# Patient Record
Sex: MALE | Race: ASIAN
[De-identification: names, ages, dates, MRNs, and addresses within clinical notes are randomized per-mention and may not be internally consistent; named-entity substitution may affect disease eponyms.]

---

## 2017-01-20 ENCOUNTER — HOSPITAL (OUTPATIENT)
Dept: OTHER | Age: 67
End: 2017-01-20
Attending: UROLOGY

## 2017-01-20 ENCOUNTER — HOSPITAL (OUTPATIENT)
Dept: OTHER | Age: 67
End: 2017-01-20
Attending: INTERNAL MEDICINE

## 2017-01-23 ENCOUNTER — HOSPITAL (OUTPATIENT)
Dept: OTHER | Age: 67
End: 2017-01-23
Attending: UROLOGY

## 2017-01-26 ENCOUNTER — HOSPITAL (OUTPATIENT)
Dept: OTHER | Age: 67
End: 2017-01-26
Attending: INTERNAL MEDICINE

## 2017-01-26 ENCOUNTER — DIAGNOSTIC TRANS (OUTPATIENT)
Dept: OTHER | Age: 67
End: 2017-01-26

## 2017-01-26 LAB — GLUCOSE BLDC GLUCOMTR-MCNC: 161 MG/DL (ref 65–99)

## 2017-01-30 ENCOUNTER — HOSPITAL (OUTPATIENT)
Dept: OTHER | Age: 67
End: 2017-01-30

## 2017-01-30 LAB
ANALYZER ANC (IANC): ABNORMAL
ERYTHROCYTE [DISTWIDTH] IN BLOOD: 12.2 % (ref 11–15)
GLUCOSE BLDC GLUCOMTR-MCNC: 172 MG/DL (ref 65–99)
GLUCOSE BLDC GLUCOMTR-MCNC: 193 MG/DL (ref 65–99)
HEMATOCRIT: 42.2 % (ref 39–51)
HGB BLD-MCNC: 14.7 GM/DL (ref 13–17)
INR PPP: 1
MCH RBC QN AUTO: 31.6 PG (ref 26–34)
MCHC RBC AUTO-ENTMCNC: 34.8 GM/DL (ref 32–36.5)
MCV RBC AUTO: 90.8 FL (ref 78–100)
PLATELET # BLD: 232 THOUSAND/MCL (ref 140–450)
PROTHROMBIN TIME: 10.8 SECONDS (ref 9.7–11.8)
PROTHROMBIN TIME: NORMAL
RBC # BLD: 4.65 MILLION/MCL (ref 4.5–5.9)
WBC # BLD: 11.8 THOUSAND/MCL (ref 4.2–11)

## 2017-02-20 ENCOUNTER — HOSPITAL (OUTPATIENT)
Dept: OTHER | Age: 67
End: 2017-02-20
Attending: UROLOGY

## 2017-02-20 LAB
GLUCOSE BLDC GLUCOMTR-MCNC: 168 MG/DL (ref 65–99)
GLUCOSE BLDC GLUCOMTR-MCNC: 175 MG/DL (ref 65–99)
GLUCOSE BLDC GLUCOMTR-MCNC: 177 MG/DL (ref 65–99)

## 2017-02-21 ENCOUNTER — CHARTING TRANS (OUTPATIENT)
Dept: OTHER | Age: 67
End: 2017-02-21

## 2017-02-24 ENCOUNTER — HOSPITAL (OUTPATIENT)
Dept: OTHER | Age: 67
End: 2017-02-24
Attending: UROLOGY

## 2017-04-04 ENCOUNTER — HOSPITAL (OUTPATIENT)
Dept: OTHER | Age: 67
End: 2017-04-04
Attending: INTERNAL MEDICINE

## 2017-04-04 LAB
ANALYZER ANC (IANC): ABNORMAL
ANION GAP SERPL CALC-SCNC: 13 MMOL/L (ref 10–20)
BUN SERPL-MCNC: 18 MG/DL (ref 6–20)
BUN/CREAT SERPL: 19 (ref 7–25)
CALCIUM SERPL-MCNC: 8.7 MG/DL (ref 8.4–10.2)
CHLORIDE: 105 MMOL/L (ref 98–107)
CO2 SERPL-SCNC: 24 MMOL/L (ref 21–32)
CREAT SERPL-MCNC: 0.93 MG/DL (ref 0.67–1.17)
ERYTHROCYTE [DISTWIDTH] IN BLOOD: 12.5 % (ref 11–15)
GLUCOSE BLDC GLUCOMTR-MCNC: 185 MG/DL (ref 65–99)
GLUCOSE BLDC GLUCOMTR-MCNC: 89 MG/DL (ref 65–99)
GLUCOSE SERPL-MCNC: 125 MG/DL (ref 65–99)
GLYCOHEMOGLOBIN: 7.4 % (ref 4.5–5.6)
HEMATOCRIT: 38.7 % (ref 39–51)
HGB BLD-MCNC: 13.4 GM/DL (ref 13–17)
MCH RBC QN AUTO: 31.8 PG (ref 26–34)
MCHC RBC AUTO-ENTMCNC: 34.6 GM/DL (ref 32–36.5)
MCV RBC AUTO: 91.9 FL (ref 78–100)
PLATELET # BLD: 221 THOUSAND/MCL (ref 140–450)
POTASSIUM SERPL-SCNC: 4.4 MMOL/L (ref 3.4–5.1)
RBC # BLD: 4.21 MILLION/MCL (ref 4.5–5.9)
SODIUM SERPL-SCNC: 138 MMOL/L (ref 135–145)
WBC # BLD: 11.1 THOUSAND/MCL (ref 4.2–11)

## 2017-04-05 LAB
ALBUMIN SERPL-MCNC: 3.6 GM/DL (ref 3.6–5.1)
ALBUMIN/GLOB SERPL: 1 {RATIO} (ref 1–2.4)
ALP SERPL-CCNC: 100 UNIT/L (ref 45–117)
ALT SERPL-CCNC: 31 UNIT/L
ANALYZER ANC (IANC): ABNORMAL
ANION GAP SERPL CALC-SCNC: 13 MMOL/L (ref 10–20)
AST SERPL-CCNC: 21 UNIT/L
BASOPHILS # BLD: 0 THOUSAND/MCL (ref 0–0.3)
BASOPHILS NFR BLD: 0 %
BILIRUB SERPL-MCNC: 0.3 MG/DL (ref 0.2–1)
BUN SERPL-MCNC: 13 MG/DL (ref 6–20)
BUN/CREAT SERPL: 14 (ref 7–25)
CALCIUM SERPL-MCNC: 8.8 MG/DL (ref 8.4–10.2)
CHLORIDE: 106 MMOL/L (ref 98–107)
CO2 SERPL-SCNC: 25 MMOL/L (ref 21–32)
CREAT SERPL-MCNC: 0.92 MG/DL (ref 0.67–1.17)
DIFFERENTIAL METHOD BLD: ABNORMAL
EOSINOPHIL # BLD: 0.4 THOUSAND/MCL (ref 0.1–0.5)
EOSINOPHIL NFR BLD: 4 %
ERYTHROCYTE [DISTWIDTH] IN BLOOD: 12.5 % (ref 11–15)
GLOBULIN SER-MCNC: 3.5 GM/DL (ref 2–4)
GLUCOSE BLDC GLUCOMTR-MCNC: 134 MG/DL (ref 65–99)
GLUCOSE BLDC GLUCOMTR-MCNC: 182 MG/DL (ref 65–99)
GLUCOSE SERPL-MCNC: 139 MG/DL (ref 65–99)
HEMATOCRIT: 40.5 % (ref 39–51)
HGB BLD-MCNC: 14.3 GM/DL (ref 13–17)
LYMPHOCYTES # BLD: 2.5 THOUSAND/MCL (ref 1–4)
LYMPHOCYTES NFR BLD: 25 %
MAGNESIUM SERPL-MCNC: 1.9 MG/DL (ref 1.7–2.4)
MCH RBC QN AUTO: 32.1 PG (ref 26–34)
MCHC RBC AUTO-ENTMCNC: 35.3 GM/DL (ref 32–36.5)
MCV RBC AUTO: 91 FL (ref 78–100)
MONOCYTES # BLD: 0.7 THOUSAND/MCL (ref 0.3–0.9)
MONOCYTES NFR BLD: 7 %
NEUTROPHILS # BLD: 6.5 THOUSAND/MCL (ref 1.8–7.7)
NEUTROPHILS NFR BLD: 64 %
NEUTS SEG NFR BLD: ABNORMAL %
PERCENT NRBC: ABNORMAL
PLATELET # BLD: 228 THOUSAND/MCL (ref 140–450)
POTASSIUM SERPL-SCNC: 4 MMOL/L (ref 3.4–5.1)
PROT SERPL-MCNC: 7.1 GM/DL (ref 6.4–8.2)
RBC # BLD: 4.45 MILLION/MCL (ref 4.5–5.9)
SODIUM SERPL-SCNC: 140 MMOL/L (ref 135–145)
WBC # BLD: 10.1 THOUSAND/MCL (ref 4.2–11)

## 2017-04-26 ENCOUNTER — PRIOR ORIGINAL RECORDS (OUTPATIENT)
Dept: OTHER | Age: 67
End: 2017-04-26

## 2017-05-01 ENCOUNTER — PRIOR ORIGINAL RECORDS (OUTPATIENT)
Dept: OTHER | Age: 67
End: 2017-05-01

## 2017-05-01 ENCOUNTER — HOSPITAL (OUTPATIENT)
Dept: OTHER | Age: 67
End: 2017-05-01
Attending: INTERNAL MEDICINE

## 2017-05-01 LAB
ANALYZER ANC (IANC): ABNORMAL
ANION GAP SERPL CALC-SCNC: 14 MMOL/L (ref 10–20)
BUN SERPL-MCNC: 26 MG/DL (ref 6–20)
BUN/CREAT SERPL: 26 (ref 7–25)
CALCIUM SERPL-MCNC: 8.8 MG/DL (ref 8.4–10.2)
CHLORIDE: 105 MMOL/L (ref 98–107)
CO2 SERPL-SCNC: 25 MMOL/L (ref 21–32)
CREAT SERPL-MCNC: 0.99 MG/DL (ref 0.67–1.17)
ERYTHROCYTE [DISTWIDTH] IN BLOOD: 12.9 % (ref 11–15)
GLUCOSE BLDC GLUCOMTR-MCNC: 125 MG/DL (ref 65–99)
GLUCOSE BLDC GLUCOMTR-MCNC: 130 MG/DL (ref 65–99)
GLUCOSE BLDC GLUCOMTR-MCNC: 164 MG/DL (ref 65–99)
GLUCOSE BLDC GLUCOMTR-MCNC: 87 MG/DL (ref 65–99)
GLUCOSE SERPL-MCNC: 145 MG/DL (ref 65–99)
HEMATOCRIT: 38.6 % (ref 39–51)
HGB BLD-MCNC: 13.4 GM/DL (ref 13–17)
MCH RBC QN AUTO: 31.7 PG (ref 26–34)
MCHC RBC AUTO-ENTMCNC: 34.7 GM/DL (ref 32–36.5)
MCV RBC AUTO: 91.3 FL (ref 78–100)
PLATELET # BLD: 219 THOUSAND/MCL (ref 140–450)
POTASSIUM SERPL-SCNC: 3.8 MMOL/L (ref 3.4–5.1)
RBC # BLD: 4.23 MILLION/MCL (ref 4.5–5.9)
SODIUM SERPL-SCNC: 140 MMOL/L (ref 135–145)
WBC # BLD: 11 THOUSAND/MCL (ref 4.2–11)

## 2017-05-02 LAB
BUN: 26 MG/DL
CALCIUM: 8.8 MG/DL
CHLORIDE: 105 MEQ/L
CREATININE, SERUM: 0.99 MG/DL
GLUCOSE BLDC GLUCOMTR-MCNC: 111 MG/DL (ref 65–99)
GLUCOSE BLDC GLUCOMTR-MCNC: 116 MG/DL (ref 65–99)
GLUCOSE BLDC GLUCOMTR-MCNC: 135 MG/DL (ref 65–99)
GLUCOSE BLDC GLUCOMTR-MCNC: 165 MG/DL (ref 65–99)
GLUCOSE: 145 MG/DL
HEMATOCRIT: 38.6 %
HEMOGLOBIN: 13.4 G/DL
PLATELETS: 219 K/UL
POTASSIUM, SERUM: 3.8 MEQ/L
RED BLOOD COUNT: 4.23 X 10-6/U
SODIUM: 140 MEQ/L
WHITE BLOOD COUNT: 11 X 10-3/U

## 2017-05-03 ENCOUNTER — PRIOR ORIGINAL RECORDS (OUTPATIENT)
Dept: OTHER | Age: 67
End: 2017-05-03

## 2017-05-03 LAB
ANION GAP SERPL CALC-SCNC: 12 MMOL/L (ref 10–20)
BUN SERPL-MCNC: 19 MG/DL (ref 6–20)
BUN/CREAT SERPL: 18 (ref 7–25)
CALCIUM SERPL-MCNC: 8.9 MG/DL (ref 8.4–10.2)
CHLORIDE: 104 MMOL/L (ref 98–107)
CO2 SERPL-SCNC: 27 MMOL/L (ref 21–32)
CREAT SERPL-MCNC: 1.05 MG/DL (ref 0.67–1.17)
GLUCOSE BLDC GLUCOMTR-MCNC: 126 MG/DL (ref 65–99)
GLUCOSE BLDC GLUCOMTR-MCNC: 134 MG/DL (ref 65–99)
GLUCOSE SERPL-MCNC: 152 MG/DL (ref 65–99)
POTASSIUM SERPL-SCNC: 4.4 MMOL/L (ref 3.4–5.1)
SODIUM SERPL-SCNC: 139 MMOL/L (ref 135–145)

## 2017-05-04 LAB
BUN: 19 MG/DL
CALCIUM: 8.9 MG/DL
CHLORIDE: 104 MEQ/L
CREATININE, SERUM: 1.05 MG/DL
GLUCOSE: 152 MG/DL
POTASSIUM, SERUM: 4.4 MEQ/L
SODIUM: 139 MEQ/L

## 2017-05-16 ENCOUNTER — MYAURORA ACCOUNT LINK (OUTPATIENT)
Dept: OTHER | Age: 67
End: 2017-05-16

## 2017-05-16 ENCOUNTER — PRIOR ORIGINAL RECORDS (OUTPATIENT)
Dept: OTHER | Age: 67
End: 2017-05-16

## 2017-05-16 ENCOUNTER — HOSPITAL (OUTPATIENT)
Dept: OTHER | Age: 67
End: 2017-05-16
Attending: INTERNAL MEDICINE

## 2017-05-24 ENCOUNTER — PRIOR ORIGINAL RECORDS (OUTPATIENT)
Dept: OTHER | Age: 67
End: 2017-05-24

## 2017-06-01 ENCOUNTER — HOSPITAL (OUTPATIENT)
Dept: OTHER | Age: 67
End: 2017-06-01
Attending: INTERNAL MEDICINE

## 2017-06-16 ENCOUNTER — HOSPITAL (OUTPATIENT)
Dept: OTHER | Age: 67
End: 2017-06-16
Attending: INTERNAL MEDICINE

## 2017-06-26 ENCOUNTER — PRIOR ORIGINAL RECORDS (OUTPATIENT)
Dept: OTHER | Age: 67
End: 2017-06-26

## 2017-07-06 PROBLEM — C61 PROSTATE CANCER (HCC): Status: ACTIVE | Noted: 2017-07-06

## 2017-08-09 PROBLEM — M54.41 CHRONIC RIGHT-SIDED LOW BACK PAIN WITH RIGHT-SIDED SCIATICA: Status: ACTIVE | Noted: 2017-08-09

## 2017-08-09 PROBLEM — G89.29 CHRONIC RIGHT-SIDED LOW BACK PAIN WITH RIGHT-SIDED SCIATICA: Status: ACTIVE | Noted: 2017-08-09

## 2017-08-24 ENCOUNTER — HOSPITAL ENCOUNTER (OUTPATIENT)
Facility: HOSPITAL | Age: 67
Setting detail: OBSERVATION
Discharge: HOME HEALTH | End: 2017-08-26
Attending: EMERGENCY MEDICINE | Admitting: INTERNAL MEDICINE
Payer: MEDICARE

## 2017-08-24 DIAGNOSIS — M54.9 INTRACTABLE BACK PAIN: Primary | ICD-10-CM

## 2017-08-24 LAB
ANION GAP SERPL CALC-SCNC: 9 MMOL/L (ref 0–18)
BASOPHILS # BLD: 0.1 K/UL (ref 0–0.2)
BASOPHILS NFR BLD: 1 %
BUN SERPL-MCNC: 24 MG/DL (ref 8–20)
BUN/CREAT SERPL: 25.8 (ref 10–20)
CALCIUM SERPL-MCNC: 9.1 MG/DL (ref 8.5–10.5)
CHLORIDE SERPL-SCNC: 104 MMOL/L (ref 95–110)
CO2 SERPL-SCNC: 23 MMOL/L (ref 22–32)
CREAT SERPL-MCNC: 0.93 MG/DL (ref 0.5–1.5)
EOSINOPHIL # BLD: 0.2 K/UL (ref 0–0.7)
EOSINOPHIL NFR BLD: 2 %
ERYTHROCYTE [DISTWIDTH] IN BLOOD BY AUTOMATED COUNT: 12.9 % (ref 11–15)
GLUCOSE BLDC GLUCOMTR-MCNC: 103 MG/DL (ref 70–99)
GLUCOSE BLDC GLUCOMTR-MCNC: 206 MG/DL (ref 70–99)
GLUCOSE SERPL-MCNC: 129 MG/DL (ref 70–99)
HCT VFR BLD AUTO: 39.2 % (ref 41–52)
HGB BLD-MCNC: 13.5 G/DL (ref 13.5–17.5)
LYMPHOCYTES # BLD: 3 K/UL (ref 1–4)
LYMPHOCYTES NFR BLD: 26 %
MCH RBC QN AUTO: 32.4 PG (ref 27–32)
MCHC RBC AUTO-ENTMCNC: 34.5 G/DL (ref 32–37)
MCV RBC AUTO: 94 FL (ref 80–100)
MONOCYTES # BLD: 0.8 K/UL (ref 0–1)
MONOCYTES NFR BLD: 7 %
NEUTROPHILS # BLD AUTO: 7.5 K/UL (ref 1.8–7.7)
NEUTROPHILS NFR BLD: 65 %
OSMOLALITY UR CALC.SUM OF ELEC: 288 MOSM/KG (ref 275–295)
PLATELET # BLD AUTO: 229 K/UL (ref 140–400)
PMV BLD AUTO: 8.6 FL (ref 7.4–10.3)
POTASSIUM SERPL-SCNC: 3.9 MMOL/L (ref 3.3–5.1)
RBC # BLD AUTO: 4.17 M/UL (ref 4.5–5.9)
SODIUM SERPL-SCNC: 136 MMOL/L (ref 136–144)
WBC # BLD AUTO: 11.6 K/UL (ref 4–11)

## 2017-08-24 PROCEDURE — 85025 COMPLETE CBC W/AUTO DIFF WBC: CPT | Performed by: EMERGENCY MEDICINE

## 2017-08-24 PROCEDURE — 82962 GLUCOSE BLOOD TEST: CPT

## 2017-08-24 PROCEDURE — 99285 EMERGENCY DEPT VISIT HI MDM: CPT

## 2017-08-24 PROCEDURE — 80048 BASIC METABOLIC PNL TOTAL CA: CPT | Performed by: EMERGENCY MEDICINE

## 2017-08-24 PROCEDURE — 96374 THER/PROPH/DIAG INJ IV PUSH: CPT

## 2017-08-24 RX ORDER — BICALUTAMIDE 50 MG/1
50 TABLET, FILM COATED ORAL DAILY
Status: DISCONTINUED | OUTPATIENT
Start: 2017-08-24 | End: 2017-08-26

## 2017-08-24 RX ORDER — TIZANIDINE 4 MG/1
4 TABLET ORAL DAILY
Status: DISCONTINUED | OUTPATIENT
Start: 2017-08-24 | End: 2017-08-26

## 2017-08-24 RX ORDER — FLUOXETINE HYDROCHLORIDE 20 MG/1
40 CAPSULE ORAL DAILY
Status: DISCONTINUED | OUTPATIENT
Start: 2017-08-24 | End: 2017-08-26

## 2017-08-24 RX ORDER — TRAZODONE HYDROCHLORIDE 150 MG/1
150 TABLET ORAL NIGHTLY
Status: DISCONTINUED | OUTPATIENT
Start: 2017-08-24 | End: 2017-08-26

## 2017-08-24 RX ORDER — HYDROMORPHONE HYDROCHLORIDE 1 MG/ML
0.5 INJECTION, SOLUTION INTRAMUSCULAR; INTRAVENOUS; SUBCUTANEOUS ONCE
Status: COMPLETED | OUTPATIENT
Start: 2017-08-24 | End: 2017-08-24

## 2017-08-24 RX ORDER — GABAPENTIN 300 MG/1
600 CAPSULE ORAL NIGHTLY
Status: DISCONTINUED | OUTPATIENT
Start: 2017-08-24 | End: 2017-08-26

## 2017-08-24 RX ORDER — ASPIRIN 81 MG/1
81 TABLET ORAL DAILY
Status: DISCONTINUED | OUTPATIENT
Start: 2017-08-24 | End: 2017-08-26

## 2017-08-24 RX ORDER — DEXTROSE MONOHYDRATE 25 G/50ML
50 INJECTION, SOLUTION INTRAVENOUS AS NEEDED
Status: DISCONTINUED | OUTPATIENT
Start: 2017-08-24 | End: 2017-08-26

## 2017-08-24 RX ORDER — VARENICLINE TARTRATE 0.5 MG/1
0.5 TABLET, FILM COATED ORAL 2 TIMES DAILY
Status: ON HOLD | COMMUNITY
End: 2017-08-24

## 2017-08-24 RX ORDER — LOSARTAN POTASSIUM 25 MG/1
25 TABLET ORAL DAILY
Status: DISCONTINUED | OUTPATIENT
Start: 2017-08-24 | End: 2017-08-26

## 2017-08-24 RX ORDER — CARVEDILOL 3.12 MG/1
3.12 TABLET ORAL 2 TIMES DAILY WITH MEALS
Status: DISCONTINUED | OUTPATIENT
Start: 2017-08-24 | End: 2017-08-26

## 2017-08-24 RX ORDER — VARENICLINE TARTRATE 1 MG/1
1 TABLET, FILM COATED ORAL 2 TIMES DAILY
COMMUNITY
End: 2018-11-28

## 2017-08-24 RX ORDER — ATORVASTATIN CALCIUM 20 MG/1
20 TABLET, FILM COATED ORAL NIGHTLY
Status: DISCONTINUED | OUTPATIENT
Start: 2017-08-24 | End: 2017-08-24

## 2017-08-24 RX ORDER — SODIUM CHLORIDE 0.9 % (FLUSH) 0.9 %
3 SYRINGE (ML) INJECTION AS NEEDED
Status: DISCONTINUED | OUTPATIENT
Start: 2017-08-24 | End: 2017-08-26

## 2017-08-24 RX ORDER — BUPROPION HYDROCHLORIDE 150 MG/1
150 TABLET, EXTENDED RELEASE ORAL 2 TIMES DAILY
Status: DISCONTINUED | OUTPATIENT
Start: 2017-08-24 | End: 2017-08-26

## 2017-08-24 RX ORDER — INSULIN ASPART 100 [IU]/ML
30 INJECTION, SOLUTION INTRAVENOUS; SUBCUTANEOUS
COMMUNITY

## 2017-08-24 RX ORDER — ATORVASTATIN CALCIUM 20 MG/1
20 TABLET, FILM COATED ORAL NIGHTLY
Status: DISCONTINUED | OUTPATIENT
Start: 2017-08-24 | End: 2017-08-26

## 2017-08-24 RX ORDER — FLUOXETINE HYDROCHLORIDE 40 MG/1
40 CAPSULE ORAL DAILY
COMMUNITY
End: 2018-11-28

## 2017-08-24 RX ORDER — HYDROCODONE BITARTRATE AND ACETAMINOPHEN 10; 325 MG/1; MG/1
1 TABLET ORAL EVERY 6 HOURS PRN
Status: DISCONTINUED | OUTPATIENT
Start: 2017-08-24 | End: 2017-08-26

## 2017-08-24 RX ORDER — TIZANIDINE 4 MG/1
4 TABLET ORAL DAILY
COMMUNITY
End: 2017-09-27

## 2017-08-24 RX ORDER — VARENICLINE TARTRATE 1 MG/1
1 TABLET, FILM COATED ORAL 2 TIMES DAILY
Status: DISCONTINUED | OUTPATIENT
Start: 2017-08-24 | End: 2017-08-26

## 2017-08-24 RX ORDER — ATORVASTATIN CALCIUM 20 MG/1
20 TABLET, FILM COATED ORAL NIGHTLY
COMMUNITY

## 2017-08-24 RX ORDER — IBUPROFEN 400 MG/1
400 TABLET ORAL 3 TIMES DAILY
Status: DISCONTINUED | OUTPATIENT
Start: 2017-08-24 | End: 2017-08-26

## 2017-08-24 NOTE — ED INITIAL ASSESSMENT (HPI)
Pt received via EMS from home with c/o chronic lower back pain. Pt states he is to have a laminectomy on 9/25, but the pain is to severe at this time. Denies any loss of bowel or bladder.

## 2017-08-24 NOTE — ED PROVIDER NOTES
Patient Seen in: [facility] AND [facility] Emergency Department    History   Patient presents with:  Back Pain (musculoskeletal)    Stated Complaint: chronic low back pain    HPI    Patient presents the emergency department complaining of low back  Pain.   Ivette DO AYSHA;  Location: [address] [place] [address]  1/11/2016: INJECTION, ANESTHETIC/STEROID, TRANSFORAMINAL * Right      Comment: Procedure: LUMBAR / TRANSFORAMINAL EPIDURAL                STEROID INJECTION;  Surgeon: Khari Olivo, [address]  1/11/2016: PATIENT [place] PREOPERATIVE ORDER FOR IV ANT* Right      Comment: Procedure: LUMBAR / TRANSFORAMINAL EPIDURAL                STEROID INJECTION;  Surgeon: Emerita Solorio;  Location: [address] [place] [address] History   Problem Relation Age of Onset   • Diabetes Father    • Diabetes Mother        Smoking status: Current Every Day Smoker                                                   Packs/day: 0.00      Years: 0.00      Smokeless tobacco: Never Used RBC 4.17 (*)     HCT 39.2 (*)     MCH 32.4 (*)     All other components within normal limits   CBC WITH DIFFERENTIAL WITH PLATELET    Narrative: The following orders were created for panel order CBC WITH DIFFERENTIAL WITH PLATELET.   Procedure

## 2017-08-25 LAB
GLUCOSE BLDC GLUCOMTR-MCNC: 102 MG/DL (ref 70–99)
GLUCOSE BLDC GLUCOMTR-MCNC: 77 MG/DL (ref 70–99)
GLUCOSE BLDC GLUCOMTR-MCNC: 82 MG/DL (ref 70–99)
GLUCOSE BLDC GLUCOMTR-MCNC: 83 MG/DL (ref 70–99)
HBA1C MFR BLD: 6.7 % (ref 4–6)

## 2017-08-25 PROCEDURE — 82962 GLUCOSE BLOOD TEST: CPT

## 2017-08-25 PROCEDURE — 83036 HEMOGLOBIN GLYCOSYLATED A1C: CPT | Performed by: INTERNAL MEDICINE

## 2017-08-25 NOTE — PROGRESS NOTES
S: Patient was seen this morning with Dr. Fred Paul. He was seen in clinic this week. He has history of previous lumbar fusion with lumbar stenosis. Patient had lumbar ZACHARY on Monday, he was admitted due to intractable back pain.   This morning, patient notes ba Patient agrees with this plan and will follow up as indicated.       Gia Martines MD

## 2017-08-25 NOTE — CHRONIC PAIN
[facility] HOSP - [facility]  Report of Consultation    Jeniffer Marypatricia Patient Status:  Observation    6/15/1950 MRN [id number]   Location [facility] Attending Tish Glsas MD   Hosp Day # 0 PCP Hugo Howard MD     Date of Admission:   TRANSFORAMINAL EPIDURAL                STEROID INJECTION;  Surgeon: Jey Gómez DO;  Location: [address] [place] [address]  5/23/2016: INJECTION, ANESTHETIC/STEROID, TRANSFORAMINAL * Right      Comment: Procedure: LUMBAR / TRANSFORAMINAL STEROID INJECTION;  Surgeon: Mily Anderson DO;  Location: [address] [place] [address]  5/23/2016: PATIENT DOCUMENTED NOT TO HAVE EXPERIENCED ANY* Right      Comment: Procedure: LUMBAR / TRANSFORAMINAL EPIDURAL                STEROID INJECTI 3.125 mg, Oral, BID with meals  •  FLUoxetine HCl (PROZAC) cap 40 mg, 40 mg, Oral, Daily  •  gabapentin (NEURONTIN) cap 600 mg, 600 mg, Oral, Nightly  •  Insulin Aspart Pen (NOVOLOG) 100 UNIT/ML flexpen 30 Units, 30 Units, Subcutaneous, TID AC  •  insulin Prostate cancer ([facility] [address]     Chronic right-sided low back pain with right-sided sciatica     Intractable back pain  s/p epidural steroid injection probable steroid flare with increase pain    Recommendations:    Agree with current meds   Dr Marcel Gonzalez on consult

## 2017-08-25 NOTE — PLAN OF CARE
Problem: Patient Centered Care  Goal: Patient preferences are identified and integrated in the patient's plan of care  Interventions:  - What would you like us to know as we care for you?  - Provide timely, complete, and accurate information to patient/fam

## 2017-08-25 NOTE — H&P
[facility] Patient Status:  Observation    6/15/1950 MRN [id number]   Location [address] [facility] Attending Shanda Schultz MD   Hosp Day # 0 PCP Maynor [place] MD     History of Present I DO AYSHA;  Location: [address] [place] [address]  5/23/2016: INJECTION, ANESTHETIC/STEROID, TRANSFORAMINAL * Right      Comment: Procedure: LUMBAR / TRANSFORAMINAL EPIDURAL                STEROID INJECTION;  Surgeon: Kane Gee, [address]  5/23/2016: PATIENT DOCUMENTED NOT TO HAVE EXPERIENCED ANY* Right      Comment: Procedure: LUMBAR / TRANSFORAMINAL EPIDURAL                STEROID INJECTION;  Surgeon: Emerita Solorio;  Location: [address] [place] [address] Inject 60 Units into the skin nightly. Disp:  Rfl:     Varenicline Tartrate 1 MG Oral Tab Take 1 mg by mouth 2 (two) times daily. Disp:  Rfl:     aspirin 81 MG Oral Tab Take 81 mg by mouth daily.  Disp:  Rfl:     HYDROcodone-acetaminophen  MG Oral Tab symmetrical, trachea midline, no adenopathy;        thyroid:  No enlargement/tenderness/nodules; no carotid    bruit or JVD   Back:     Pain and tenderness on LS spine palpation , LROM on flexion extension    Lungs:     Clear to auscultation bilaterally, r

## 2017-08-25 NOTE — H&P
S: Mr. Steven Treadwell was admitted yesterday due to intractable back pain. He has history of previous lumbar fusion with Dr. Lakeisha Grayson. He recently saw Dr. Lakeisha Grayson in clinic and was found to have stenosis above level of previous fusion. He had an ZACHARY last week.  Today

## 2017-08-26 VITALS
DIASTOLIC BLOOD PRESSURE: 53 MMHG | HEIGHT: 67 IN | SYSTOLIC BLOOD PRESSURE: 106 MMHG | RESPIRATION RATE: 18 BRPM | BODY MASS INDEX: 35 KG/M2 | WEIGHT: 223 LBS | OXYGEN SATURATION: 97 % | HEART RATE: 72 BPM | TEMPERATURE: 98 F

## 2017-08-26 LAB
ALBUMIN SERPL BCP-MCNC: 3.5 G/DL (ref 3.5–4.8)
ALBUMIN/GLOB SERPL: 1.4 {RATIO} (ref 1–2)
ALP SERPL-CCNC: 91 U/L (ref 32–100)
ALT SERPL-CCNC: 28 U/L (ref 17–63)
ANION GAP SERPL CALC-SCNC: 3 MMOL/L (ref 0–18)
AST SERPL-CCNC: 28 U/L (ref 15–41)
BASOPHILS # BLD: 0 K/UL (ref 0–0.2)
BASOPHILS NFR BLD: 0 %
BILIRUB SERPL-MCNC: 0.5 MG/DL (ref 0.3–1.2)
BUN SERPL-MCNC: 19 MG/DL (ref 8–20)
BUN/CREAT SERPL: 17 (ref 10–20)
CALCIUM SERPL-MCNC: 9.2 MG/DL (ref 8.5–10.5)
CHLORIDE SERPL-SCNC: 106 MMOL/L (ref 95–110)
CO2 SERPL-SCNC: 30 MMOL/L (ref 22–32)
CREAT SERPL-MCNC: 1.12 MG/DL (ref 0.5–1.5)
EOSINOPHIL # BLD: 0.4 K/UL (ref 0–0.7)
EOSINOPHIL NFR BLD: 3 %
ERYTHROCYTE [DISTWIDTH] IN BLOOD BY AUTOMATED COUNT: 12.8 % (ref 11–15)
GLOBULIN PLAS-MCNC: 2.5 G/DL (ref 2.5–3.7)
GLUCOSE BLDC GLUCOMTR-MCNC: 101 MG/DL (ref 70–99)
GLUCOSE SERPL-MCNC: 103 MG/DL (ref 70–99)
HCT VFR BLD AUTO: 39.6 % (ref 41–52)
HGB BLD-MCNC: 13.5 G/DL (ref 13.5–17.5)
LYMPHOCYTES # BLD: 3.1 K/UL (ref 1–4)
LYMPHOCYTES NFR BLD: 23 %
MCH RBC QN AUTO: 32.3 PG (ref 27–32)
MCHC RBC AUTO-ENTMCNC: 34.2 G/DL (ref 32–37)
MCV RBC AUTO: 94.5 FL (ref 80–100)
MONOCYTES # BLD: 1 K/UL (ref 0–1)
MONOCYTES NFR BLD: 7 %
NEUTROPHILS # BLD AUTO: 9.2 K/UL (ref 1.8–7.7)
NEUTROPHILS NFR BLD: 67 %
OSMOLALITY UR CALC.SUM OF ELEC: 291 MOSM/KG (ref 275–295)
PLATELET # BLD AUTO: 220 K/UL (ref 140–400)
PMV BLD AUTO: 8.8 FL (ref 7.4–10.3)
POTASSIUM SERPL-SCNC: 4.4 MMOL/L (ref 3.3–5.1)
PROT SERPL-MCNC: 6 G/DL (ref 5.9–8.4)
RBC # BLD AUTO: 4.19 M/UL (ref 4.5–5.9)
SODIUM SERPL-SCNC: 139 MMOL/L (ref 136–144)
WBC # BLD AUTO: 13.7 K/UL (ref 4–11)

## 2017-08-26 PROCEDURE — 97166 OT EVAL MOD COMPLEX 45 MIN: CPT

## 2017-08-26 PROCEDURE — 80053 COMPREHEN METABOLIC PANEL: CPT | Performed by: FAMILY MEDICINE

## 2017-08-26 PROCEDURE — 82962 GLUCOSE BLOOD TEST: CPT

## 2017-08-26 PROCEDURE — 97116 GAIT TRAINING THERAPY: CPT

## 2017-08-26 PROCEDURE — 96372 THER/PROPH/DIAG INJ SC/IM: CPT

## 2017-08-26 PROCEDURE — 97535 SELF CARE MNGMENT TRAINING: CPT

## 2017-08-26 PROCEDURE — 85025 COMPLETE CBC W/AUTO DIFF WBC: CPT | Performed by: FAMILY MEDICINE

## 2017-08-26 PROCEDURE — 97162 PT EVAL MOD COMPLEX 30 MIN: CPT

## 2017-08-26 NOTE — OCCUPATIONAL THERAPY NOTE
OCCUPATIONAL THERAPY EVALUATION - INPATIENT      Room Number: 521/521-A  Evaluation Date: 8/26/2017  Type of Evaluation: Initial  Presenting Problem:  (Low Back Pain)    Physician Order: IP Consult to Occupational Therapy  Reason for Therapy: ADL/IADL Dysf ANESTHETIC/STEROID, TRANSFORAMINAL * Right      Comment: Procedure: LUMBAR / TRANSFORAMINAL EPIDURAL                STEROID INJECTION;  Surgeon: Syeda Beach DO;  Location: [address] [place] [address]  3/21/2016: INJECTION, ANESTHETIC/STER Right      Comment: Procedure: LUMBAR / TRANSFORAMINAL EPIDURAL                STEROID INJECTION;  Surgeon: Rachael Garcia DO;  Location: [address] [place] [address]  3/21/2016: PATIENT DOCUMENTED NOT TO HAVE EXPERIENCED ANY* Right      Comm assistance to have help 3x/week for 3hours/day. Also plans on having a tub transfer bench. SUBJECTIVE  \"I'm just tired of having pain. \"    Patient self-stated goal is: \"I wish they could move up my surgery. \"    OCCUPATIONAL THERAPY EXAMINATION     O patient questions and concerns addressed; Alarm set      PLAN  Patient has been evaluated and presents with no skilled Occupational Therapy needs at this time. Patient discharged from Occupational Therapy services.   Please re-order if a new functional limi

## 2017-08-26 NOTE — DISCHARGE PLANNING
8/26/17 CM Discharge planning / MDO for home health  Pt resides with family, referral made to [facility] or home RN/PT/OT. Pt will be discharged home later today.       Malu Melton X [phone]

## 2017-08-26 NOTE — HOME CARE LIAISON
DIAGNOSES AND PERTINENT MEDICAL HISTORY: INTRACTABLE LOW BACK PAIN    FACILITY NAME AND DC DATE: [address] [place] [address] 8/26/17    BEDSIDE VISIT WITH: PTNT    SERVICES ORDERED: RN PT    VERIFIED PATIENT ADDRESS, PHONE NUMBER AND CAREGIVER : YES    PCP IS DR. Yareli Chaney

## 2017-08-26 NOTE — PHYSICAL THERAPY NOTE
PHYSICAL THERAPY EVALUATION - INPATIENT     Room Number: 521/521-A  Evaluation Date: 8/26/2017  Type of Evaluation: Initial  Physician Order: PT Eval and Treat    Presenting Problem: back pain  Reason for Therapy: Mobility Dysfunction and Discharge Jorge Alberto related to current admission: back pain, has previous back surgeries    Problem List  Principal Problem:    Intractable back pain      Past Medical History  Past Medical History:   Diagnosis Date   • Back problem    • Cancer ([facility] [address] 2017    new diagnosis.  ca Right      Comment: Procedure: LUMBAR / TRANSFORAMINAL EPIDURAL                STEROID INJECTION;  Surgeon: Mariano Haines DO;  Location: [address] [place] [address]  5/23/2016: INJECTION, ANESTHETIC/STEROID, TRANSFORAMINAL * Right      Comm TRANSFORAMINAL EPIDURAL                STEROID INJECTION;  Surgeon: León Parnell DO;  Location: [address] [place] [address]  5/23/2016: PATIENT [place] PREOPERATIVE ORDER FOR IV ANT* Right      Comment: Procedure: LUMBAR / TRANSFORAMINAL blankets)?: None   -   Sitting down on and standing up from a chair with arms (e.g., wheelchair, bedside commode, etc.): A Little   -   Moving from lying on back to sitting on the side of the bed?: A Little   How much help from another person does the tariq #4   Current Status    Goal #5 Patient to demonstrate independence with home activity/exercise instructions provided to patient in preparation for discharge.    Goal #5   Current Status    Goal #6    Goal #6  Current Status

## 2017-08-26 NOTE — PROGRESS NOTES
Physical therapy and Occupational therapy ambulated patient in crooks and on stairs. They are in agreement with discharge and are recommending both therapies at home. A referral was made to Cony Gonsalez.   PING Valles has seen patient and provided con

## 2017-08-26 NOTE — DISCHARGE SUMMARY
Zachery Lara Patient Status:  Observation    6/15/1950 MRN [id number]   Location [facility] Attending Grady Lai MD   Hosp Day # 0 PCP Yoan Colon MD     Date of Admission:  History of Present Illness: Patient came to the ER with intractable pain in the back after an epidural injection last Monday. Denies any weakness of the extremities, bladder or bowel issues    Hospital Course: Patient was admitted to the hospital MG Tabs      Take 15 mg by mouth daily. Refills:  0     Nortriptyline HCl 75 MG Caps  Commonly known as:  PAMELOR      Take 75 mg by mouth nightly. Refills:  0     simvastatin 40 MG Tabs  Commonly known as:  ZOCOR      Take 40 mg by mouth nightly.    Re

## 2017-08-26 NOTE — PLAN OF CARE
Patient is alert. Oriented times three. He is ambulating with walker. Denies bowel or bladder incontinence. Tolerating diet. He has been seen by physical and occupational therapy. Saline lock has been removed.   Discharge instructions have been reviewed

## 2017-08-27 ENCOUNTER — TELEPHONE (OUTPATIENT)
Dept: CARDIOLOGY UNIT | Facility: HOSPITAL | Age: 67
End: 2017-08-27

## 2017-09-20 PROBLEM — M48.062 SPINAL STENOSIS, LUMBAR REGION, WITH NEUROGENIC CLAUDICATION: Status: ACTIVE | Noted: 2017-09-20

## 2017-09-22 ENCOUNTER — PRIOR ORIGINAL RECORDS (OUTPATIENT)
Dept: OTHER | Age: 67
End: 2017-09-22

## 2017-09-25 ENCOUNTER — APPOINTMENT (OUTPATIENT)
Dept: LAB | Facility: HOSPITAL | Age: 67
DRG: 520 | End: 2017-09-25
Attending: NEUROMUSCULOSKELETAL MEDICINE & OMM
Payer: MEDICARE

## 2017-09-25 ENCOUNTER — HOSPITAL ENCOUNTER (OUTPATIENT)
Dept: GENERAL RADIOLOGY | Facility: HOSPITAL | Age: 67
Discharge: HOME | DRG: 520 | End: 2017-09-25
Attending: NEUROMUSCULOSKELETAL MEDICINE & OMM
Payer: MEDICARE

## 2017-09-25 DIAGNOSIS — Z01.818 PREOPERATIVE CLEARANCE: ICD-10-CM

## 2017-09-25 DIAGNOSIS — Z01.818 PREOP EXAMINATION: ICD-10-CM

## 2017-09-25 LAB
BACTERIA UR QL AUTO: NEGATIVE /HPF
BILIRUB UR QL: NEGATIVE
CLARITY UR: CLEAR
COLOR UR: YELLOW
GLUCOSE UR-MCNC: >=500 MG/DL
HGB UR QL STRIP.AUTO: NEGATIVE
KETONES UR-MCNC: NEGATIVE MG/DL
LEUKOCYTE ESTERASE UR QL STRIP.AUTO: NEGATIVE
NITRITE UR QL STRIP.AUTO: NEGATIVE
PH UR: 5 [PH] (ref 5–8)
PROT UR-MCNC: NEGATIVE MG/DL
RBC #/AREA URNS AUTO: 1 /HPF
SP GR UR STRIP: 1.02 (ref 1–1.03)
UROBILINOGEN UR STRIP-ACNC: <2
VIT C UR-MCNC: NEGATIVE MG/DL
WBC #/AREA URNS AUTO: 3 /HPF

## 2017-09-25 PROCEDURE — 81001 URINALYSIS AUTO W/SCOPE: CPT

## 2017-09-25 PROCEDURE — 71010 XR CHEST AP/PA (1 VIEW) (CPT=71010): CPT | Performed by: NEUROMUSCULOSKELETAL MEDICINE & OMM

## 2017-09-25 PROCEDURE — 87081 CULTURE SCREEN ONLY: CPT

## 2017-09-26 ENCOUNTER — APPOINTMENT (OUTPATIENT)
Dept: GENERAL RADIOLOGY | Facility: HOSPITAL | Age: 67
DRG: 520 | End: 2017-09-26
Attending: ORTHOPAEDIC SURGERY
Payer: MEDICARE

## 2017-09-26 ENCOUNTER — ANESTHESIA EVENT (OUTPATIENT)
Dept: SURGERY | Facility: HOSPITAL | Age: 67
DRG: 520 | End: 2017-09-26
Payer: MEDICARE

## 2017-09-26 ENCOUNTER — SURGERY (OUTPATIENT)
Age: 67
End: 2017-09-26

## 2017-09-26 ENCOUNTER — HOSPITAL ENCOUNTER (INPATIENT)
Facility: HOSPITAL | Age: 67
LOS: 1 days | Discharge: HOME HEALTH | DRG: 520 | End: 2017-09-27
Attending: ORTHOPAEDIC SURGERY | Admitting: ORTHOPAEDIC SURGERY
Payer: MEDICARE

## 2017-09-26 ENCOUNTER — ANESTHESIA (OUTPATIENT)
Dept: SURGERY | Facility: HOSPITAL | Age: 67
DRG: 520 | End: 2017-09-26
Payer: MEDICARE

## 2017-09-26 DIAGNOSIS — M48.062 SPINAL STENOSIS, LUMBAR REGION, WITH NEUROGENIC CLAUDICATION: Primary | ICD-10-CM

## 2017-09-26 DIAGNOSIS — Z98.1 S/P LUMBAR FUSION: ICD-10-CM

## 2017-09-26 LAB
ERYTHROCYTE [DISTWIDTH] IN BLOOD BY AUTOMATED COUNT: 12.8 % (ref 11–15)
GLUCOSE BLDC GLUCOMTR-MCNC: 117 MG/DL (ref 70–99)
GLUCOSE BLDC GLUCOMTR-MCNC: 132 MG/DL (ref 70–99)
GLUCOSE BLDC GLUCOMTR-MCNC: 191 MG/DL (ref 70–99)
GLUCOSE BLDC GLUCOMTR-MCNC: 251 MG/DL (ref 70–99)
HCT VFR BLD AUTO: 37.3 % (ref 41–52)
HGB BLD-MCNC: 12.6 G/DL (ref 13.5–17.5)
MCH RBC QN AUTO: 31.8 PG (ref 27–32)
MCHC RBC AUTO-ENTMCNC: 33.9 G/DL (ref 32–37)
MCV RBC AUTO: 94 FL (ref 80–100)
MRSA DNA SPEC QL NAA+PROBE: NEGATIVE
PLATELET # BLD AUTO: 213 K/UL (ref 140–400)
PMV BLD AUTO: 8.6 FL (ref 7.4–10.3)
RBC # BLD AUTO: 3.96 M/UL (ref 4.5–5.9)
WBC # BLD AUTO: 10.8 K/UL (ref 4–11)

## 2017-09-26 PROCEDURE — 85027 COMPLETE CBC AUTOMATED: CPT | Performed by: PHYSICIAN ASSISTANT

## 2017-09-26 PROCEDURE — 01NB0ZZ RELEASE LUMBAR NERVE, OPEN APPROACH: ICD-10-PCS | Performed by: ORTHOPAEDIC SURGERY

## 2017-09-26 PROCEDURE — 72020 X-RAY EXAM OF SPINE 1 VIEW: CPT | Performed by: ORTHOPAEDIC SURGERY

## 2017-09-26 PROCEDURE — 88304 TISSUE EXAM BY PATHOLOGIST: CPT | Performed by: ORTHOPAEDIC SURGERY

## 2017-09-26 PROCEDURE — 87641 MR-STAPH DNA AMP PROBE: CPT | Performed by: ORTHOPAEDIC SURGERY

## 2017-09-26 PROCEDURE — 0SB00ZZ EXCISION OF LUMBAR VERTEBRAL JOINT, OPEN APPROACH: ICD-10-PCS | Performed by: ORTHOPAEDIC SURGERY

## 2017-09-26 PROCEDURE — 76001 XR C-ARM FLUORO >1 HOUR  (CPT=76001): CPT | Performed by: ORTHOPAEDIC SURGERY

## 2017-09-26 PROCEDURE — 82962 GLUCOSE BLOOD TEST: CPT

## 2017-09-26 RX ORDER — METOPROLOL TARTRATE 5 MG/5ML
2.5 INJECTION INTRAVENOUS ONCE
Status: DISCONTINUED | OUTPATIENT
Start: 2017-09-26 | End: 2017-09-26 | Stop reason: HOSPADM

## 2017-09-26 RX ORDER — HALOPERIDOL 5 MG/ML
0.25 INJECTION INTRAMUSCULAR ONCE AS NEEDED
Status: DISCONTINUED | OUTPATIENT
Start: 2017-09-26 | End: 2017-09-26 | Stop reason: HOSPADM

## 2017-09-26 RX ORDER — SODIUM CHLORIDE, SODIUM LACTATE, POTASSIUM CHLORIDE, CALCIUM CHLORIDE 600; 310; 30; 20 MG/100ML; MG/100ML; MG/100ML; MG/100ML
INJECTION, SOLUTION INTRAVENOUS CONTINUOUS
Status: DISCONTINUED | OUTPATIENT
Start: 2017-09-26 | End: 2017-09-27

## 2017-09-26 RX ORDER — HYDROMORPHONE HYDROCHLORIDE 1 MG/ML
0.4 INJECTION, SOLUTION INTRAMUSCULAR; INTRAVENOUS; SUBCUTANEOUS EVERY 5 MIN PRN
Status: DISCONTINUED | OUTPATIENT
Start: 2017-09-26 | End: 2017-09-26 | Stop reason: HOSPADM

## 2017-09-26 RX ORDER — MORPHINE SULFATE 10 MG/ML
6 INJECTION, SOLUTION INTRAMUSCULAR; INTRAVENOUS EVERY 10 MIN PRN
Status: DISCONTINUED | OUTPATIENT
Start: 2017-09-26 | End: 2017-09-26 | Stop reason: HOSPADM

## 2017-09-26 RX ORDER — METOCLOPRAMIDE HYDROCHLORIDE 5 MG/ML
10 INJECTION INTRAMUSCULAR; INTRAVENOUS EVERY 6 HOURS PRN
Status: DISCONTINUED | OUTPATIENT
Start: 2017-09-26 | End: 2017-09-27

## 2017-09-26 RX ORDER — SUCCINYLCHOLINE CHLORIDE 20 MG/ML
INJECTION INTRAMUSCULAR; INTRAVENOUS AS NEEDED
Status: DISCONTINUED | OUTPATIENT
Start: 2017-09-26 | End: 2017-09-26 | Stop reason: SURG

## 2017-09-26 RX ORDER — DOCUSATE SODIUM 100 MG/1
100 CAPSULE, LIQUID FILLED ORAL 2 TIMES DAILY
Status: DISCONTINUED | OUTPATIENT
Start: 2017-09-26 | End: 2017-09-27

## 2017-09-26 RX ORDER — NORTRIPTYLINE HYDROCHLORIDE 25 MG/1
75 CAPSULE ORAL NIGHTLY
Status: DISCONTINUED | OUTPATIENT
Start: 2017-09-26 | End: 2017-09-27

## 2017-09-26 RX ORDER — ONDANSETRON 2 MG/ML
4 INJECTION INTRAMUSCULAR; INTRAVENOUS ONCE AS NEEDED
Status: DISCONTINUED | OUTPATIENT
Start: 2017-09-26 | End: 2017-09-26 | Stop reason: HOSPADM

## 2017-09-26 RX ORDER — SODIUM PHOSPHATE, DIBASIC AND SODIUM PHOSPHATE, MONOBASIC 7; 19 G/133ML; G/133ML
1 ENEMA RECTAL ONCE AS NEEDED
Status: DISCONTINUED | OUTPATIENT
Start: 2017-09-26 | End: 2017-09-27

## 2017-09-26 RX ORDER — HYDROMORPHONE HYDROCHLORIDE 1 MG/ML
0.8 INJECTION, SOLUTION INTRAMUSCULAR; INTRAVENOUS; SUBCUTANEOUS EVERY 2 HOUR PRN
Status: DISCONTINUED | OUTPATIENT
Start: 2017-09-26 | End: 2017-09-27

## 2017-09-26 RX ORDER — GLYCOPYRROLATE 0.2 MG/ML
INJECTION INTRAMUSCULAR; INTRAVENOUS AS NEEDED
Status: DISCONTINUED | OUTPATIENT
Start: 2017-09-26 | End: 2017-09-26 | Stop reason: SURG

## 2017-09-26 RX ORDER — ROCURONIUM BROMIDE 10 MG/ML
INJECTION, SOLUTION INTRAVENOUS AS NEEDED
Status: DISCONTINUED | OUTPATIENT
Start: 2017-09-26 | End: 2017-09-26 | Stop reason: SURG

## 2017-09-26 RX ORDER — CARVEDILOL 3.12 MG/1
3.12 TABLET ORAL 2 TIMES DAILY WITH MEALS
Status: DISCONTINUED | OUTPATIENT
Start: 2017-09-26 | End: 2017-09-27

## 2017-09-26 RX ORDER — HYDROMORPHONE HYDROCHLORIDE 1 MG/ML
0.2 INJECTION, SOLUTION INTRAMUSCULAR; INTRAVENOUS; SUBCUTANEOUS EVERY 5 MIN PRN
Status: DISCONTINUED | OUTPATIENT
Start: 2017-09-26 | End: 2017-09-26 | Stop reason: HOSPADM

## 2017-09-26 RX ORDER — ONDANSETRON 2 MG/ML
4 INJECTION INTRAMUSCULAR; INTRAVENOUS EVERY 4 HOURS PRN
Status: DISCONTINUED | OUTPATIENT
Start: 2017-09-26 | End: 2017-09-27

## 2017-09-26 RX ORDER — MORPHINE SULFATE 2 MG/ML
2 INJECTION, SOLUTION INTRAMUSCULAR; INTRAVENOUS EVERY 10 MIN PRN
Status: DISCONTINUED | OUTPATIENT
Start: 2017-09-26 | End: 2017-09-26 | Stop reason: HOSPADM

## 2017-09-26 RX ORDER — TRAZODONE HYDROCHLORIDE 150 MG/1
150 TABLET ORAL NIGHTLY
Status: DISCONTINUED | OUTPATIENT
Start: 2017-09-26 | End: 2017-09-27

## 2017-09-26 RX ORDER — SENNOSIDES 8.6 MG
17.2 TABLET ORAL NIGHTLY
Status: DISCONTINUED | OUTPATIENT
Start: 2017-09-26 | End: 2017-09-27

## 2017-09-26 RX ORDER — HYDROMORPHONE HYDROCHLORIDE 1 MG/ML
0.4 INJECTION, SOLUTION INTRAMUSCULAR; INTRAVENOUS; SUBCUTANEOUS EVERY 2 HOUR PRN
Status: DISCONTINUED | OUTPATIENT
Start: 2017-09-26 | End: 2017-09-27

## 2017-09-26 RX ORDER — NEOSTIGMINE METHYLSULFATE 0.5 MG/ML
INJECTION INTRAVENOUS AS NEEDED
Status: DISCONTINUED | OUTPATIENT
Start: 2017-09-26 | End: 2017-09-26 | Stop reason: SURG

## 2017-09-26 RX ORDER — HYDROMORPHONE HYDROCHLORIDE 1 MG/ML
0.6 INJECTION, SOLUTION INTRAMUSCULAR; INTRAVENOUS; SUBCUTANEOUS EVERY 5 MIN PRN
Status: DISCONTINUED | OUTPATIENT
Start: 2017-09-26 | End: 2017-09-26 | Stop reason: HOSPADM

## 2017-09-26 RX ORDER — BUPROPION HYDROCHLORIDE 150 MG/1
150 TABLET, EXTENDED RELEASE ORAL 2 TIMES DAILY
Status: DISCONTINUED | OUTPATIENT
Start: 2017-09-26 | End: 2017-09-27

## 2017-09-26 RX ORDER — POLYETHYLENE GLYCOL 3350 17 G/17G
17 POWDER, FOR SOLUTION ORAL DAILY PRN
Status: DISCONTINUED | OUTPATIENT
Start: 2017-09-26 | End: 2017-09-27

## 2017-09-26 RX ORDER — DIPHENHYDRAMINE HYDROCHLORIDE 50 MG/ML
25 INJECTION INTRAMUSCULAR; INTRAVENOUS EVERY 4 HOURS PRN
Status: DISCONTINUED | OUTPATIENT
Start: 2017-09-26 | End: 2017-09-27

## 2017-09-26 RX ORDER — MIDAZOLAM HYDROCHLORIDE 1 MG/ML
INJECTION INTRAMUSCULAR; INTRAVENOUS AS NEEDED
Status: DISCONTINUED | OUTPATIENT
Start: 2017-09-26 | End: 2017-09-26 | Stop reason: SURG

## 2017-09-26 RX ORDER — FAMOTIDINE 20 MG/1
20 TABLET ORAL ONCE
Status: COMPLETED | OUTPATIENT
Start: 2017-09-26 | End: 2017-09-26

## 2017-09-26 RX ORDER — VARENICLINE TARTRATE 1 MG/1
1 TABLET, FILM COATED ORAL 2 TIMES DAILY
Status: DISCONTINUED | OUTPATIENT
Start: 2017-09-26 | End: 2017-09-27

## 2017-09-26 RX ORDER — ACETAMINOPHEN 325 MG/1
650 TABLET ORAL EVERY 4 HOURS PRN
Status: DISCONTINUED | OUTPATIENT
Start: 2017-09-26 | End: 2017-09-27

## 2017-09-26 RX ORDER — MAGNESIUM HYDROXIDE 1200 MG/15ML
LIQUID ORAL CONTINUOUS PRN
Status: DISCONTINUED | OUTPATIENT
Start: 2017-09-26 | End: 2017-09-26

## 2017-09-26 RX ORDER — HYDROCODONE BITARTRATE AND ACETAMINOPHEN 5; 325 MG/1; MG/1
2 TABLET ORAL AS NEEDED
Status: DISCONTINUED | OUTPATIENT
Start: 2017-09-26 | End: 2017-09-26 | Stop reason: HOSPADM

## 2017-09-26 RX ORDER — MORPHINE SULFATE 4 MG/ML
4 INJECTION, SOLUTION INTRAMUSCULAR; INTRAVENOUS EVERY 10 MIN PRN
Status: DISCONTINUED | OUTPATIENT
Start: 2017-09-26 | End: 2017-09-26 | Stop reason: HOSPADM

## 2017-09-26 RX ORDER — FLUOXETINE HYDROCHLORIDE 20 MG/1
40 CAPSULE ORAL DAILY
Status: DISCONTINUED | OUTPATIENT
Start: 2017-09-26 | End: 2017-09-27

## 2017-09-26 RX ORDER — ONDANSETRON 2 MG/ML
INJECTION INTRAMUSCULAR; INTRAVENOUS AS NEEDED
Status: DISCONTINUED | OUTPATIENT
Start: 2017-09-26 | End: 2017-09-26 | Stop reason: SURG

## 2017-09-26 RX ORDER — ATORVASTATIN CALCIUM 20 MG/1
20 TABLET, FILM COATED ORAL NIGHTLY
Status: DISCONTINUED | OUTPATIENT
Start: 2017-09-26 | End: 2017-09-27

## 2017-09-26 RX ORDER — ACETAMINOPHEN 325 MG/1
650 TABLET ORAL ONCE
Status: COMPLETED | OUTPATIENT
Start: 2017-09-26 | End: 2017-09-26

## 2017-09-26 RX ORDER — BUPIVACAINE HYDROCHLORIDE 2.5 MG/ML
INJECTION, SOLUTION EPIDURAL; INFILTRATION; INTRACAUDAL AS NEEDED
Status: DISCONTINUED | OUTPATIENT
Start: 2017-09-26 | End: 2017-09-26 | Stop reason: HOSPADM

## 2017-09-26 RX ORDER — NALOXONE HYDROCHLORIDE 0.4 MG/ML
80 INJECTION, SOLUTION INTRAMUSCULAR; INTRAVENOUS; SUBCUTANEOUS AS NEEDED
Status: DISCONTINUED | OUTPATIENT
Start: 2017-09-26 | End: 2017-09-26 | Stop reason: HOSPADM

## 2017-09-26 RX ORDER — HYDROCODONE BITARTRATE AND ACETAMINOPHEN 10; 325 MG/1; MG/1
2 TABLET ORAL EVERY 6 HOURS PRN
Status: DISCONTINUED | OUTPATIENT
Start: 2017-09-26 | End: 2017-09-27

## 2017-09-26 RX ORDER — LIDOCAINE HYDROCHLORIDE 10 MG/ML
INJECTION, SOLUTION EPIDURAL; INFILTRATION; INTRACAUDAL; PERINEURAL AS NEEDED
Status: DISCONTINUED | OUTPATIENT
Start: 2017-09-26 | End: 2017-09-26 | Stop reason: SURG

## 2017-09-26 RX ORDER — BISACODYL 10 MG
10 SUPPOSITORY, RECTAL RECTAL
Status: DISCONTINUED | OUTPATIENT
Start: 2017-09-26 | End: 2017-09-27

## 2017-09-26 RX ORDER — BICALUTAMIDE 50 MG/1
50 TABLET, FILM COATED ORAL DAILY
Status: DISCONTINUED | OUTPATIENT
Start: 2017-09-26 | End: 2017-09-27

## 2017-09-26 RX ORDER — DEXAMETHASONE SODIUM PHOSPHATE 4 MG/ML
VIAL (ML) INJECTION AS NEEDED
Status: DISCONTINUED | OUTPATIENT
Start: 2017-09-26 | End: 2017-09-26 | Stop reason: SURG

## 2017-09-26 RX ORDER — HYDROCODONE BITARTRATE AND ACETAMINOPHEN 10; 325 MG/1; MG/1
1 TABLET ORAL EVERY 4 HOURS PRN
Status: DISCONTINUED | OUTPATIENT
Start: 2017-09-26 | End: 2017-09-27

## 2017-09-26 RX ORDER — DIPHENHYDRAMINE HCL 25 MG
25 CAPSULE ORAL EVERY 4 HOURS PRN
Status: DISCONTINUED | OUTPATIENT
Start: 2017-09-26 | End: 2017-09-27

## 2017-09-26 RX ORDER — LOSARTAN POTASSIUM 25 MG/1
25 TABLET ORAL DAILY
Status: DISCONTINUED | OUTPATIENT
Start: 2017-09-27 | End: 2017-09-27

## 2017-09-26 RX ORDER — GABAPENTIN 300 MG/1
600 CAPSULE ORAL NIGHTLY
Status: DISCONTINUED | OUTPATIENT
Start: 2017-09-26 | End: 2017-09-27

## 2017-09-26 RX ORDER — DEXTROSE MONOHYDRATE 25 G/50ML
50 INJECTION, SOLUTION INTRAVENOUS AS NEEDED
Status: DISCONTINUED | OUTPATIENT
Start: 2017-09-26 | End: 2017-09-27

## 2017-09-26 RX ORDER — DIAZEPAM 5 MG/1
5 TABLET ORAL EVERY 6 HOURS PRN
Status: DISCONTINUED | OUTPATIENT
Start: 2017-09-26 | End: 2017-09-27

## 2017-09-26 RX ORDER — HYDROCODONE BITARTRATE AND ACETAMINOPHEN 5; 325 MG/1; MG/1
1 TABLET ORAL AS NEEDED
Status: DISCONTINUED | OUTPATIENT
Start: 2017-09-26 | End: 2017-09-26 | Stop reason: HOSPADM

## 2017-09-26 RX ORDER — HYDROMORPHONE HYDROCHLORIDE 1 MG/ML
0.2 INJECTION, SOLUTION INTRAMUSCULAR; INTRAVENOUS; SUBCUTANEOUS EVERY 2 HOUR PRN
Status: DISCONTINUED | OUTPATIENT
Start: 2017-09-26 | End: 2017-09-27

## 2017-09-26 RX ADMIN — MIDAZOLAM HYDROCHLORIDE 1 MG: 1 INJECTION INTRAMUSCULAR; INTRAVENOUS at 10:19:00

## 2017-09-26 RX ADMIN — NEOSTIGMINE METHYLSULFATE 5 MG: 0.5 INJECTION INTRAVENOUS at 12:00:00

## 2017-09-26 RX ADMIN — ONDANSETRON 4 MG: 2 INJECTION INTRAMUSCULAR; INTRAVENOUS at 11:45:00

## 2017-09-26 RX ADMIN — HYDROMORPHONE HYDROCHLORIDE 0.5 MG: 1 INJECTION, SOLUTION INTRAMUSCULAR; INTRAVENOUS; SUBCUTANEOUS at 11:10:00

## 2017-09-26 RX ADMIN — LIDOCAINE HYDROCHLORIDE 50 MG: 10 INJECTION, SOLUTION EPIDURAL; INFILTRATION; INTRACAUDAL; PERINEURAL at 10:24:00

## 2017-09-26 RX ADMIN — DEXAMETHASONE SODIUM PHOSPHATE 4 MG: 4 MG/ML VIAL (ML) INJECTION at 10:34:00

## 2017-09-26 RX ADMIN — ROCURONIUM BROMIDE 40 MG: 10 INJECTION, SOLUTION INTRAVENOUS at 10:34:00

## 2017-09-26 RX ADMIN — SODIUM CHLORIDE, SODIUM LACTATE, POTASSIUM CHLORIDE, CALCIUM CHLORIDE: 600; 310; 30; 20 INJECTION, SOLUTION INTRAVENOUS at 12:20:00

## 2017-09-26 RX ADMIN — SODIUM CHLORIDE, SODIUM LACTATE, POTASSIUM CHLORIDE, CALCIUM CHLORIDE: 600; 310; 30; 20 INJECTION, SOLUTION INTRAVENOUS at 10:19:00

## 2017-09-26 RX ADMIN — SUCCINYLCHOLINE CHLORIDE 100 MG: 20 INJECTION INTRAMUSCULAR; INTRAVENOUS at 10:24:00

## 2017-09-26 RX ADMIN — GLYCOPYRROLATE 0.6 MG: 0.2 INJECTION INTRAMUSCULAR; INTRAVENOUS at 12:00:00

## 2017-09-26 NOTE — H&P
HISTORY OF PRESENT ILLNESS: Homer Dubin is a 37-year-old male, here for surgery. He recently has had epidural injections and physical therapy without significant pain relief.  He is still having significant back and leg pain is causing him to limp on / TRANSFORAMINAL EPIDURAL                STEROID INJECTION;  Surgeon: Will Sabillon DO;  Location: [address] [place] [address]  3/21/2016: INJECTION, ANESTHETIC/STEROID, TRANSFORAMINAL * Right      Comment: Procedure: Horacio Farias / Kingsley Kenny STEROID INJECTION;  Surgeon: Katie Lowery DO;  Location: [address] [place] [address]  3/21/2016: PATIENT [place] PREOPERATIVE ORDER FOR IV ANT* Right      Comment: Procedure: LUMBAR / TRANSFORAMINAL EPIDURAL                STEROID INJECTI stenosis, moderate in degree at L3-L4 and mild at L4-L5. There is moderate to severe  bilateral foraminal stenosis at both levels. 2. Progressive degenerative disc disease and facet joint arthropathy at L2-L3.  There is now moderate  to severe [place]

## 2017-09-26 NOTE — OPERATIVE REPORT
PATIENT'S NAME:  Rosie Mccarthy   ATTENDING PHYSICIAN:  Jeff Jones M.D. OPERATING PHYSICIAN:  Jeff Jones M.D.     PATIENT CSN#:  [id number]  MEDICAL RECORD #:  [id number]  YOB: 1950    ADMISSION DATE: 9/26/2017  OPERATION DATE: 9/26/ incision on the skin and used a Bovie cautery to expose down to the fascial layer on the bilateral aspect of the lamina. Using the Bovie, we retracted the muscular layer off the lamina using a Pelletier. We placed our retractor in position and confirmed level.

## 2017-09-26 NOTE — ANESTHESIA POSTPROCEDURE EVALUATION
Patient: Dimple Meléndez    Procedure Summary     Date:  09/26/17 Room / Location:  [address] [place] [address] MAIN OR 10 / [address] [place] [address] MAIN OR    Anesthesia Start:  7573 Anesthesia Stop:      Procedure:  LUMBAR LAMINECTOMY 1 LEVEL (N/A ) Diagnosis:  (Lumbar spinal stenosis with neurog

## 2017-09-26 NOTE — BRIEF OP NOTE
Pre-Operative Diagnosis: Lumbar spinal stenosis with neurogenic claudication, status post lumbar spinal fusion      Post-Operative Diagnosis: Lumbar spinal stenosis with neurogenic claudication, status post lumbar spinal fusion      Procedure Performed:

## 2017-09-26 NOTE — INTERVAL H&P NOTE
Pre-op Diagnosis: Lumbar spinal stenosis with neurogenic claudication, status post lumbar spinal fusion     The above referenced H&P was reviewed by Komal Urena PA-C on 9/26/2017, the patient was examined and no significant changes have occurred in the

## 2017-09-26 NOTE — CM/SW NOTE
LEN met with the pt. At bedside. The pt. Lives with his wife, dtr, son and grandchildren in a 2 level home with his bedroom downstairs. The pt. Reports being independent prior to admission with adls, ambulation and driving. The pt.  Is planning to return

## 2017-09-26 NOTE — ANESTHESIA PREPROCEDURE EVALUATION
Anesthesia PreOp Note    HPI:     Jessie Mccormick is a 74year old male who presents for preoperative consultation requested by: Urbano Dalton MD    Date of Surgery: 9/26/2017    Procedure(s):  LUMBAR LAMINECTOMY 1 LEVEL  Indication: Lumbar spinal stenosi EPIDURAL                STEROID INJECTION;  Surgeon: Erickson Aguilera DO;  Location: [address] [place] [address]  3/21/2016: INJECTION, ANESTHETIC/STEROID, TRANSFORAMINAL * Right      Comment: Procedure: LUMBAR / TRANSFORAMINAL EPIDURAL Surgeon: Christel Macias DO;  Location: [address] [place] [address]  3/21/2016: PATIENT DOCUMENTED NOT TO HAVE EXPERIENCED ANY* Right      Comment: Procedure: LUMBAR / TRANSFORAMINAL EPIDURAL                STEROID INJECTION;  Surgeon: Rosana Gifford Unknown time   atorvastatin 20 MG Oral Tab Take 20 mg by mouth nightly. Disp:  Rfl:  9/25/2017 at Unknown time   TiZANidine HCl 4 MG Oral Tab Take 4 mg by mouth daily.  Disp:  Rfl:  9/25/2017 at Unknown time   insulin aspart 100 UNIT/ML Subcutaneous Solutio 0.9 % 500 mL IVPB 15 mg/kg (Adjusted) Intravenous Once Lida Zacarias MD    mupirocin (BACTROBAN) 2% nasal ointment OINT 1 Application 1 Application Each Nare BID Lida Zacarias MD 1 Application at 26/93/60 0800     No current [facility]-ordered outpatient prescri 09/20/17  1113 09/26/17  0749   BP:  123/81   BP Location:  Right arm   Pulse:  82   Resp:  16   Temp:  97.6 °F (36.4 °C)   TempSrc:  Oral   SpO2:  96%   Weight: 102.1 kg (225 lb) 99.8 kg (220 lb)   Height: 1.702 m (5' 7\")         Anesthesia ROS/Med Hx an

## 2017-09-27 VITALS
WEIGHT: 220 LBS | SYSTOLIC BLOOD PRESSURE: 96 MMHG | DIASTOLIC BLOOD PRESSURE: 56 MMHG | TEMPERATURE: 98 F | BODY MASS INDEX: 34.53 KG/M2 | HEIGHT: 67 IN | OXYGEN SATURATION: 96 % | HEART RATE: 63 BPM | RESPIRATION RATE: 20 BRPM

## 2017-09-27 LAB
ANION GAP SERPL CALC-SCNC: 8 MMOL/L (ref 0–18)
BUN SERPL-MCNC: 12 MG/DL (ref 8–20)
BUN/CREAT SERPL: 13.2 (ref 10–20)
CALCIUM SERPL-MCNC: 8.9 MG/DL (ref 8.5–10.5)
CHLORIDE SERPL-SCNC: 100 MMOL/L (ref 95–110)
CO2 SERPL-SCNC: 29 MMOL/L (ref 22–32)
CREAT SERPL-MCNC: 0.91 MG/DL (ref 0.5–1.5)
ERYTHROCYTE [DISTWIDTH] IN BLOOD BY AUTOMATED COUNT: 12.8 % (ref 11–15)
GLUCOSE BLDC GLUCOMTR-MCNC: 160 MG/DL (ref 70–99)
GLUCOSE BLDC GLUCOMTR-MCNC: 220 MG/DL (ref 70–99)
GLUCOSE SERPL-MCNC: 176 MG/DL (ref 70–99)
HBA1C MFR BLD: 6.7 % (ref 4–6)
HCT VFR BLD AUTO: 35 % (ref 41–52)
HGB BLD-MCNC: 11.9 G/DL (ref 13.5–17.5)
MCH RBC QN AUTO: 32.2 PG (ref 27–32)
MCHC RBC AUTO-ENTMCNC: 34.2 G/DL (ref 32–37)
MCV RBC AUTO: 94.3 FL (ref 80–100)
OSMOLALITY UR CALC.SUM OF ELEC: 288 MOSM/KG (ref 275–295)
PLATELET # BLD AUTO: 233 K/UL (ref 140–400)
PMV BLD AUTO: 8.5 FL (ref 7.4–10.3)
POTASSIUM SERPL-SCNC: 4 MMOL/L (ref 3.3–5.1)
RBC # BLD AUTO: 3.71 M/UL (ref 4.5–5.9)
SODIUM SERPL-SCNC: 137 MMOL/L (ref 136–144)
WBC # BLD AUTO: 13.2 K/UL (ref 4–11)

## 2017-09-27 PROCEDURE — 97116 GAIT TRAINING THERAPY: CPT

## 2017-09-27 PROCEDURE — 97535 SELF CARE MNGMENT TRAINING: CPT

## 2017-09-27 PROCEDURE — 80048 BASIC METABOLIC PNL TOTAL CA: CPT | Performed by: PHYSICIAN ASSISTANT

## 2017-09-27 PROCEDURE — 83036 HEMOGLOBIN GLYCOSYLATED A1C: CPT | Performed by: FAMILY MEDICINE

## 2017-09-27 PROCEDURE — 97161 PT EVAL LOW COMPLEX 20 MIN: CPT

## 2017-09-27 PROCEDURE — 97165 OT EVAL LOW COMPLEX 30 MIN: CPT

## 2017-09-27 PROCEDURE — 82962 GLUCOSE BLOOD TEST: CPT

## 2017-09-27 PROCEDURE — 85027 COMPLETE CBC AUTOMATED: CPT | Performed by: PHYSICIAN ASSISTANT

## 2017-09-27 NOTE — PLAN OF CARE
Diabetes/Glucose Control    • Glucose maintained within prescribed range Progressing        DISCHARGE PLANNING    • Discharge to home or other facility with appropriate resources Progressing        Impaired Activities of Daily Living    • Achieve highest/s belongings within personal reach.

## 2017-09-27 NOTE — CM/SW NOTE
Plan is for discharge home today 9/27 with [facility]. Referral has been made to [facility] and they are aware of discharge plan.       Vinayak Bates [facility] ext [phone]

## 2017-09-27 NOTE — PHYSICAL THERAPY NOTE
PHYSICAL THERAPY EVALUATION - INPATIENT     Room Number: 406/406-A  Evaluation Date: 9/27/2017  Type of Evaluation: Initial  Physician Order: PT Eval and Treat    Presenting Problem: Spinal stenosis s/p L3 laminectomy with excision of cyst  Reason for with neurogenic claudication  Active Problems:    Lumbar radiculopathy    Spondylolisthesis    S/P lumbar fusion    Hip pain, right    Degenerative arthritis of pelvis    Spondylolisthesis of lumbar region    Prostate cancer [facility])      Past Medical History Surgeon: Syeda Beach DO;  Location: [address] [place] [address]  3/21/2016: INJECTION, ANESTHETIC/STEROID, TRANSFORAMINAL * Right      Comment: Procedure: LUMBAR / TRANSFORAMINAL EPIDURAL                STEROID INJECTION;  Surgeon: Fay Molina ;  Location: [address] [place] [address]  3/21/2016: PATIENT [place] PREOPERATIVE ORDER FOR IV ANT* Right      Comment: Procedure: LUMBAR / TRANSFORAMINAL EPIDURAL                STEROID INJECTION;  Surgeon: Sara Kaur DO;  Location: [facility]  Neurological Findings: Sensation (numbness in left foot)                   ACTIVITY TOLERANCE  No shortness of breath    AM-PAC '6-Clicks' INPATIENT SHORT FORM - BASIC MOBILITY  How much difficulty does the patient currently have. ..  -   Turning Goal #3 Patient is able to ambulate 300 feet with assist device: walker - rolling at assistance level: modified independent   Goal #3   Current Status    Goal #4 Patient will negotiate 9 stairs/one curb w/ assistive device and supervision   Goal #4   Cur

## 2017-09-27 NOTE — PLAN OF CARE
Diabetes/Glucose Control    • Glucose maintained within prescribed range Adequate for Discharge        DISCHARGE PLANNING    • Discharge to home or other facility with appropriate resources Adequate for Discharge        Impaired Activities of Daily Living in place. Call light and personal belongings within reach, using appropriately. Hourly rounding being performed per protocol. No s/s of infection noted, afebrile.  Skin intact other than lumbar back incision, dressing to remain in place until follow up appo

## 2017-09-27 NOTE — OCCUPATIONAL THERAPY NOTE
OCCUPATIONAL THERAPY EVALUATION - INPATIENT      Room Number: 406/406-A  Evaluation Date: 9/27/2017  Type of Evaluation: Initial  Presenting Problem:  (s/p laminectomy)    Physician Order: IP Consult to Occupational Therapy  Reason for Therapy: ADL/IADL Dy mention of complication, not stated as uncontrolled    • Unspecified essential hypertension    • Visual impairment     reading       Past Surgical History  Past Surgical History:  3/2013: BACK SURGERY      Comment: left L4 Hemilaminectomy   2/2013: BACK FERNANDEZ Right      Comment: Procedure: LUMBAR / TRANSFORAMINAL EPIDURAL                STEROID INJECTION;  Surgeon: Ingrid Hernandez DO;  Location: [address] [place] [address]  3/21/2016: LUANNE-PAULY BY PENNY LOR[facility] 5+ YR Right      Comment: Proced of Home: House  Home Layout: Multi-level  Lives With: Spouse; Family       Shower/Tub and Equipment: Walk-in shower;Grab bar  Other Equipment:  (SC, R/W, W/C)    Occupation/Status:  (retired)     Drives: Yes  Patient Regularly Uses: None      Prior Level of self-stated goal is: to go home tomorrow with his wife to assist

## 2017-09-27 NOTE — PHYSICAL THERAPY NOTE
PHYSICAL THERAPY TREATMENT NOTE - INPATIENT    Room Number: 054/404-A       Presenting Problem: Spinal stenosis s/p L3 laminectomy with excision of cyst    Problem List  Principal Problem:    Spinal stenosis, lumbar region, with neurogenic claudication  A Static Sitting: Good  Dynamic Sitting: Good           Static Standing: Good  Dynamic Standing: Good    ACTIVITY TOLERANCE  No shortness of breath    AM-PAC '6-Clicks' INPATIENT SHORT FOR Current Status Goal met with straight cane    Goal #4 Patient will negotiate 9 stairs/one curb w/ assistive device and supervision   Goal #4   Current Status Goal met   Goal #5 Patient to demonstrate independence with home activity/exercise instructions

## 2017-09-27 NOTE — HOME CARE LIAISON
DIAGNOSES AND PERTINENT MEDICAL HISTORY: LUMBAR SPINAL STENOSIS WITH NEUROGENIC CLAUDICATION, STATUS POST LUMBAR SPINAL FUSION     FACILITY NAME AND DC DATE: [facility] 9/27    BEDSIDE VISIT WITH: PTNT    SERVICES ORDERED: RN PT    VERIFIED PATIENT ADDRESS, [place] [id number]

## 2017-09-27 NOTE — PROGRESS NOTES
S: Mr. Nazanin Locke is doing well today, sitting up in chair. He denies leg pain at this time, notes incisional back pain/muscle tightness, controlled with pain medications at this time. He is tolerating diet.       O:   NAD, AOx3   09/27/17  0427 09/27/17  07

## 2017-09-27 NOTE — DISCHARGE SUMMARY
Casie Monae 80 Discharge Summary    Reny Laguna Patient Status:  Inpatient    6/15/1950 MRN [id number]   Location [facility] 4W/SW/SE Attending Carla Sarabia MD   Hosp Day # 1 PCP Nadine Mcgowan MD     Date of Admission: or gallop   Abdomen:     Soft, non-tender, bowel sounds active all four quadrants,     no masses, no organomegaly   Genitalia:     Rectal:     Extremities:   Extremities normal, atraumatic, no cyanosis or edema   Pulses:   2+ and symmetric all extremities 1  Associated Diagnoses:Spinal stenosis, lumbar region, with neurogenic claudication; S/P lumbar fusion; Preop examination    HYDROcodone-acetaminophen  MG Oral Tab  Take 1 tablet by mouth every 4 (four) hours as needed for Pain.   Qty: 50 tablet Refi Tab  Take 15 mg by mouth daily. Associated Diagnoses:S/P laminectomy; LBP (low back pain); Spondylolisthesis; Lumbar spinal stenosis; Lumbar radiculopathy    TraZODone HCl (DESYREL) 150 MG Oral Tab  Take 150 mg by mouth nightly.   Associated Diagnoses:S/P

## 2017-09-28 ENCOUNTER — TELEPHONE (OUTPATIENT)
Dept: CARDIOLOGY UNIT | Facility: HOSPITAL | Age: 67
End: 2017-09-28

## 2017-09-30 ENCOUNTER — HOSPITAL ENCOUNTER (EMERGENCY)
Facility: HOSPITAL | Age: 67
Discharge: HOME | End: 2017-09-30
Attending: EMERGENCY MEDICINE
Payer: MEDICARE

## 2017-09-30 VITALS
WEIGHT: 223 LBS | BODY MASS INDEX: 35 KG/M2 | DIASTOLIC BLOOD PRESSURE: 85 MMHG | RESPIRATION RATE: 18 BRPM | OXYGEN SATURATION: 96 % | HEIGHT: 67 IN | HEART RATE: 71 BPM | SYSTOLIC BLOOD PRESSURE: 118 MMHG | TEMPERATURE: 98 F

## 2017-09-30 DIAGNOSIS — L24.A9 DRAINAGE FROM WOUND: Primary | ICD-10-CM

## 2017-09-30 PROCEDURE — 80048 BASIC METABOLIC PNL TOTAL CA: CPT | Performed by: EMERGENCY MEDICINE

## 2017-09-30 PROCEDURE — 85025 COMPLETE CBC W/AUTO DIFF WBC: CPT | Performed by: EMERGENCY MEDICINE

## 2017-09-30 PROCEDURE — 96374 THER/PROPH/DIAG INJ IV PUSH: CPT

## 2017-09-30 PROCEDURE — 99284 EMERGENCY DEPT VISIT MOD MDM: CPT

## 2017-09-30 PROCEDURE — 96375 TX/PRO/DX INJ NEW DRUG ADDON: CPT

## 2017-09-30 RX ORDER — KETOROLAC TROMETHAMINE 15 MG/ML
15 INJECTION, SOLUTION INTRAMUSCULAR; INTRAVENOUS ONCE
Status: COMPLETED | OUTPATIENT
Start: 2017-09-30 | End: 2017-09-30

## 2017-09-30 RX ORDER — HYDROMORPHONE HYDROCHLORIDE 1 MG/ML
INJECTION, SOLUTION INTRAMUSCULAR; INTRAVENOUS; SUBCUTANEOUS
Status: COMPLETED
Start: 2017-09-30 | End: 2017-09-30

## 2017-09-30 RX ORDER — HYDROMORPHONE HYDROCHLORIDE 1 MG/ML
0.5 INJECTION, SOLUTION INTRAMUSCULAR; INTRAVENOUS; SUBCUTANEOUS ONCE
Status: COMPLETED | OUTPATIENT
Start: 2017-09-30 | End: 2017-09-30

## 2017-09-30 RX ORDER — HYDROMORPHONE HYDROCHLORIDE 1 MG/ML
0.5 INJECTION, SOLUTION INTRAMUSCULAR; INTRAVENOUS; SUBCUTANEOUS ONCE
Status: DISCONTINUED | OUTPATIENT
Start: 2017-09-30 | End: 2017-09-30

## 2017-09-30 NOTE — ED PROVIDER NOTES
Patient Seen in: [facility] AND [facility] Emergency Department    History   Patient presents with:  Back Pain (musculoskeletal)    Stated Complaint: back pain    HPI    The patient presents emergency department complaining of back pain and drainage from a lumba EPIDURAL                STEROID INJECTION;  Surgeon: Kane Gee DO;  Location: [address] [place] [address]  5/23/2016: INJECTION, ANESTHETIC/STEROID, TRANSFORAMINAL * Right      Comment: Procedure: LUMBAR / TRANSFORAMINAL EPIDURAL Surgeon: Sara Kaur DO;  Location: [address] [place] [address]  5/23/2016: PATIENT DOCUMENTED NOT TO HAVE EXPERIENCED ANY* Right      Comment: Procedure: LUMBAR / TRANSFORAMINAL EPIDURAL                STEROID INJECTION;  Surgeon: Patrick Dimas Device: n/a    Current:/85   Pulse 71   Temp 97.7 °F (36.5 °C) (Oral)   Resp 18   Ht 170.2 cm (5' 7\")   Wt 101.2 kg   SpO2 96%   BMI 34.93 kg/m²         Physical Exam   Constitutional: He is oriented to person, place, and time.  He appears well-devel DIFFERENTIAL[[id number]]          Abnormal            Final result                 Please view results for these tests on the individual orders.       ============================================================  ED Course  ---------------------------------

## 2017-09-30 NOTE — ED INITIAL ASSESSMENT (HPI)
Pt arrived via medics to rm 32 for complaint of lower back pain, states he had back surgery on Tuesday and unable to control pain

## 2017-10-11 ENCOUNTER — HOSPITAL (OUTPATIENT)
Dept: OTHER | Age: 67
End: 2017-10-11
Attending: UROLOGY

## 2017-10-11 LAB
ALBUMIN SERPL-MCNC: 3.4 GM/DL (ref 3.6–5.1)
ALBUMIN/GLOB SERPL: 0.9 {RATIO} (ref 1–2.4)
ALP SERPL-CCNC: 128 UNIT/L (ref 45–117)
ALT SERPL-CCNC: 26 UNIT/L
AMORPH SED URNS QL MICRO: NORMAL
ANALYZER ANC (IANC): ABNORMAL
ANION GAP SERPL CALC-SCNC: 13 MMOL/L (ref 10–20)
APPEARANCE UR: CLEAR
AST SERPL-CCNC: 19 UNIT/L
BILIRUB SERPL-MCNC: 0.2 MG/DL (ref 0.2–1)
BILIRUB UR QL: NEGATIVE
BUN SERPL-MCNC: 18 MG/DL (ref 6–20)
BUN/CREAT SERPL: 20 (ref 7–25)
CALCIUM SERPL-MCNC: 8.9 MG/DL (ref 8.4–10.2)
CAOX CRY URNS QL MICRO: NORMAL
CHLORIDE: 103 MMOL/L (ref 98–107)
CO2 SERPL-SCNC: 29 MMOL/L (ref 21–32)
COLOR UR: YELLOW
CREAT SERPL-MCNC: 0.89 MG/DL (ref 0.67–1.17)
EPITH CASTS #/AREA URNS LPF: NORMAL /[LPF]
ERYTHROCYTE [DISTWIDTH] IN BLOOD: 12.2 % (ref 11–15)
FATTY CASTS #/AREA URNS LPF: NORMAL /[LPF]
GLOBULIN SER-MCNC: 3.9 GM/DL (ref 2–4)
GLUCOSE SERPL-MCNC: 159 MG/DL (ref 65–99)
GLUCOSE UR-MCNC: NEGATIVE MG/DL
GLYCOHEMOGLOBIN: 6.9 % (ref 4.5–5.6)
GRAN CASTS #/AREA URNS LPF: NORMAL /[LPF]
HEMATOCRIT: 37.3 % (ref 39–51)
HGB BLD-MCNC: 12.6 GM/DL (ref 13–17)
HGB UR QL: NEGATIVE
HYALINE CASTS #/AREA URNS LPF: NORMAL /[LPF]
KETONES UR-MCNC: NEGATIVE MG/DL
LENGTH OF FAST TIME PATIENT: ABNORMAL HR
LEUKOCYTE ESTERASE UR QL STRIP: NEGATIVE
MCH RBC QN AUTO: 31.8 PG (ref 26–34)
MCHC RBC AUTO-ENTMCNC: 33.8 GM/DL (ref 32–36.5)
MCV RBC AUTO: 94.2 FL (ref 78–100)
MICROSCOPIC (MT): NORMAL
MIXED CELL CASTS #/AREA URNS LPF: NORMAL /[LPF]
MUCOUS THREADS URNS QL MICRO: NORMAL
NITRITE UR QL: NEGATIVE
PH UR: 5 UNIT (ref 5–7)
PLATELET # BLD: 309 THOUSAND/MCL (ref 140–450)
POTASSIUM SERPL-SCNC: 4.6 MMOL/L (ref 3.4–5.1)
PROT SERPL-MCNC: 7.3 GM/DL (ref 6.4–8.2)
PROT UR QL: NEGATIVE MG/DL
PSA SERPL-MCNC: 4.05 NG/ML
RBC # BLD: 3.96 MILLION/MCL (ref 4.5–5.9)
RBC CASTS #/AREA URNS LPF: NORMAL /[LPF]
RENAL EPI CELLS #/AREA URNS HPF: NORMAL /[HPF]
SODIUM SERPL-SCNC: 140 MMOL/L (ref 135–145)
SP GR UR: 1.02 (ref 1–1.03)
SPECIMEN SOURCE: NORMAL
SPERM URNS QL MICRO: NORMAL
T VAGINALIS URNS QL MICRO: NORMAL
TRI-PHOS CRY URNS QL MICRO: NORMAL
URATE CRY URNS QL MICRO: NORMAL
URNS CMNT MICRO: NORMAL
UROBILINOGEN UR QL: 0.2 MG/DL (ref 0–1)
WAXY CASTS #/AREA URNS LPF: NORMAL /[LPF]
WBC # BLD: 10.5 THOUSAND/MCL (ref 4.2–11)
WBC CASTS #/AREA URNS LPF: NORMAL /[LPF]
YEAST HYPHAE URNS QL MICRO: NORMAL
YEAST URNS QL MICRO: NORMAL

## 2017-11-13 ENCOUNTER — MYAURORA ACCOUNT LINK (OUTPATIENT)
Dept: OTHER | Age: 67
End: 2017-11-13

## 2017-11-13 ENCOUNTER — PRIOR ORIGINAL RECORDS (OUTPATIENT)
Dept: OTHER | Age: 67
End: 2017-11-13

## 2018-04-19 ENCOUNTER — APPOINTMENT (OUTPATIENT)
Dept: CT IMAGING | Facility: HOSPITAL | Age: 68
End: 2018-04-19
Attending: EMERGENCY MEDICINE
Payer: MEDICARE

## 2018-04-19 ENCOUNTER — APPOINTMENT (OUTPATIENT)
Dept: GENERAL RADIOLOGY | Facility: HOSPITAL | Age: 68
End: 2018-04-19
Attending: EMERGENCY MEDICINE
Payer: MEDICARE

## 2018-04-19 ENCOUNTER — HOSPITAL ENCOUNTER (EMERGENCY)
Facility: HOSPITAL | Age: 68
Discharge: HOME | End: 2018-04-19
Attending: EMERGENCY MEDICINE
Payer: MEDICARE

## 2018-04-19 VITALS
WEIGHT: 223 LBS | RESPIRATION RATE: 16 BRPM | HEIGHT: 67 IN | OXYGEN SATURATION: 98 % | DIASTOLIC BLOOD PRESSURE: 70 MMHG | SYSTOLIC BLOOD PRESSURE: 110 MMHG | HEART RATE: 81 BPM | TEMPERATURE: 97 F | BODY MASS INDEX: 35 KG/M2

## 2018-04-19 DIAGNOSIS — S00.81XA ABRASION OF FACE, INITIAL ENCOUNTER: ICD-10-CM

## 2018-04-19 DIAGNOSIS — S80.01XA CONTUSION OF RIGHT KNEE, INITIAL ENCOUNTER: Primary | ICD-10-CM

## 2018-04-19 PROCEDURE — 82962 GLUCOSE BLOOD TEST: CPT

## 2018-04-19 PROCEDURE — 96372 THER/PROPH/DIAG INJ SC/IM: CPT

## 2018-04-19 PROCEDURE — 70450 CT HEAD/BRAIN W/O DYE: CPT | Performed by: EMERGENCY MEDICINE

## 2018-04-19 PROCEDURE — 70486 CT MAXILLOFACIAL W/O DYE: CPT | Performed by: EMERGENCY MEDICINE

## 2018-04-19 PROCEDURE — 99285 EMERGENCY DEPT VISIT HI MDM: CPT

## 2018-04-19 PROCEDURE — 73560 X-RAY EXAM OF KNEE 1 OR 2: CPT | Performed by: EMERGENCY MEDICINE

## 2018-04-19 RX ORDER — HYDROCODONE BITARTRATE AND ACETAMINOPHEN 5; 325 MG/1; MG/1
2 TABLET ORAL ONCE
Status: COMPLETED | OUTPATIENT
Start: 2018-04-19 | End: 2018-04-19

## 2018-04-19 RX ORDER — INSULIN ASPART 100 [IU]/ML
30 INJECTION, SOLUTION INTRAVENOUS; SUBCUTANEOUS ONCE
Status: COMPLETED | OUTPATIENT
Start: 2018-04-19 | End: 2018-04-19

## 2018-04-19 RX ORDER — HYDROCODONE BITARTRATE AND ACETAMINOPHEN 5; 325 MG/1; MG/1
1-2 TABLET ORAL EVERY 4 HOURS PRN
Qty: 10 TABLET | Refills: 0 | Status: SHIPPED | OUTPATIENT
Start: 2018-04-19 | End: 2018-04-26

## 2018-04-19 NOTE — ED NOTES
Discharged home with plan to follow up with PCP as indicated. Alert and interactive. Hemodynamically stable. Agrees with pain management plan. WC to waiting room awaiting family arrival

## 2018-04-19 NOTE — ED NOTES
Ace wrap applied to R knee + CMS post application Verbalizes improved comfort after analgesia/ice and wrap

## 2018-04-19 NOTE — ED PROVIDER NOTES
Patient Seen in: [facility] AND [facility] Emergency Department     History   Patient presents with:  Fall    Stated Complaint: Knee pain/ fall (Muculoskeletal)     HPI    74year old male complains a tripped and fell couple days ago, landing on his right knee, ha Procedure: LUMBAR / TRANSFORAMINAL EPIDURAL                STEROID INJECTION;  Surgeon: Luis Gonzalez DO;  Location: [address] [place] [address]  1/11/2016: INJECTION, ANESTHETIC/STEROID, TRANSFORAMINAL * Right      Comment: Procedure: Gia Horton STEROID INJECTION;  Surgeon: Inderjit Chatman DO;  Location: [address] [place] [address]  1/11/2016: [facility] [place] [facility] FOR [facility]* Right      Comment: Procedure: LUMBAR / TRANSFORAMINAL EPIDURAL 600 mg by mouth nightly. Meloxicam 15 MG Oral Tab,  Take 15 mg by mouth daily. TraZODone HCl (DESYREL) 150 MG Oral Tab,  Take 150 mg by mouth nightly.       Allergies:   Amoxicillin             Anaphylaxis    Comment:Rash, swollen tongue, chest pain conjunctiva is not injected. No scleral icterus. Pupils are equal.   Neck: Normal range of motion and phonation normal. Neck supple. No JVD present. Pulmonary/Chest: Effort normal. No accessory muscle usage or stridor. No apnea and no tachypnea.  No respi Unremarkable     temporomandibular joints. Bony lesion or     additional significant fracture. NASAL CAVITY: Bilateral slightly comminuted minimally displaced nasal bone     fractures.  Septal deviation anteriorly, right to left with septal spur TECHNIQUE: CT images were obtained without contrast material.  Automated     exposure control for dose reduction was used. FINDINGS:     CSF SPACES: Ventricles, cisterns, and sulci are mildly prominent but     appear probably sized for age.   No h screw. A healed     proximal fibular fracture. No acute fracture or subluxation. Enthesophyte     at the quadriceps attachment to the patella. Medial and lateral compartment joint spaces are relatively preserved in     the nonweightbearing position. right-sided sciatica; Intractable back pain; and Spinal stenosis, lumbar region, with neurogenic claudication on his problem list. These problems contribute to the complexity of this ED evaluation.     Medical Record Review: I personally reviewed available at High risk of significant complication or comorbidity.         Disposition and Plan     Clinical Impression:  Contusion of right knee, initial encounter  (primary encounter diagnosis)  Abrasion of face, initial encounter    Disposition:  Discharge    Foll

## 2018-04-19 NOTE — ED NOTES
Care assumed from triage [facility] to room stand pivot independent to stretcher Alert and interactive S/P mechanical ground level fall sustaining R knee/R lateral face contusions R knee edematous/painful + distal pulses + abrasion to R zygoma States took [place] th

## 2018-04-19 NOTE — ED INITIAL ASSESSMENT (HPI)
Alexandra Dodson few days ago, missed a step in the garage. Alexandra Smack onto right knee and scraped right side of face. Denies loc, takes daily 81 mg asa.

## 2018-05-08 ENCOUNTER — PRIOR ORIGINAL RECORDS (OUTPATIENT)
Dept: OTHER | Age: 68
End: 2018-05-08

## 2018-05-09 ENCOUNTER — HOSPITAL (OUTPATIENT)
Dept: OTHER | Age: 68
End: 2018-05-09
Attending: INTERNAL MEDICINE

## 2018-05-11 ENCOUNTER — PRIOR ORIGINAL RECORDS (OUTPATIENT)
Dept: OTHER | Age: 68
End: 2018-05-11

## 2018-05-14 ENCOUNTER — PRIOR ORIGINAL RECORDS (OUTPATIENT)
Dept: OTHER | Age: 68
End: 2018-05-14

## 2018-05-14 ENCOUNTER — HOSPITAL ENCOUNTER (INPATIENT)
Facility: HOSPITAL | Age: 68
LOS: 4 days | Discharge: HOME HEALTH | DRG: 552 | End: 2018-05-18
Attending: INTERNAL MEDICINE | Admitting: INTERNAL MEDICINE
Payer: MEDICARE

## 2018-05-14 ENCOUNTER — APPOINTMENT (OUTPATIENT)
Dept: MRI IMAGING | Facility: HOSPITAL | Age: 68
DRG: 552 | End: 2018-05-14
Attending: INTERNAL MEDICINE
Payer: MEDICARE

## 2018-05-14 PROCEDURE — 70553 MRI BRAIN STEM W/O & W/DYE: CPT | Performed by: INTERNAL MEDICINE

## 2018-05-14 RX ORDER — PANTOPRAZOLE SODIUM 40 MG/1
40 TABLET, DELAYED RELEASE ORAL
Status: DISCONTINUED | OUTPATIENT
Start: 2018-05-15 | End: 2018-05-18

## 2018-05-14 RX ORDER — ASPIRIN 81 MG/1
81 TABLET ORAL DAILY
Status: DISCONTINUED | OUTPATIENT
Start: 2018-05-14 | End: 2018-05-18

## 2018-05-14 RX ORDER — 0.9 % SODIUM CHLORIDE 0.9 %
VIAL (ML) INJECTION
Status: DISPENSED
Start: 2018-05-14 | End: 2018-05-15

## 2018-05-14 RX ORDER — HYDROCODONE BITARTRATE AND ACETAMINOPHEN 5; 325 MG/1; MG/1
1-2 TABLET ORAL EVERY 6 HOURS PRN
COMMUNITY
End: 2018-11-01

## 2018-05-14 RX ORDER — DOCUSATE SODIUM 100 MG/1
100 CAPSULE, LIQUID FILLED ORAL DAILY
Status: DISCONTINUED | OUTPATIENT
Start: 2018-05-14 | End: 2018-05-18

## 2018-05-14 RX ORDER — ATORVASTATIN CALCIUM 20 MG/1
20 TABLET, FILM COATED ORAL NIGHTLY
Status: DISCONTINUED | OUTPATIENT
Start: 2018-05-14 | End: 2018-05-18

## 2018-05-14 RX ORDER — DOCUSATE SODIUM 100 MG/1
100 CAPSULE, LIQUID FILLED ORAL DAILY
COMMUNITY

## 2018-05-14 RX ORDER — TRAZODONE HYDROCHLORIDE 150 MG/1
300 TABLET ORAL NIGHTLY
Status: DISCONTINUED | OUTPATIENT
Start: 2018-05-14 | End: 2018-05-18

## 2018-05-14 RX ORDER — DEXTROSE MONOHYDRATE 25 G/50ML
50 INJECTION, SOLUTION INTRAVENOUS AS NEEDED
Status: DISCONTINUED | OUTPATIENT
Start: 2018-05-14 | End: 2018-05-18

## 2018-05-14 RX ORDER — BICALUTAMIDE 50 MG/1
50 TABLET, FILM COATED ORAL DAILY
Status: DISCONTINUED | OUTPATIENT
Start: 2018-05-15 | End: 2018-05-18

## 2018-05-14 RX ORDER — FLUOXETINE HYDROCHLORIDE 20 MG/1
40 CAPSULE ORAL DAILY
Status: DISCONTINUED | OUTPATIENT
Start: 2018-05-14 | End: 2018-05-18

## 2018-05-14 RX ORDER — IBUPROFEN 600 MG/1
600 TABLET ORAL 4 TIMES DAILY
Status: DISCONTINUED | OUTPATIENT
Start: 2018-05-15 | End: 2018-05-18

## 2018-05-14 RX ORDER — GABAPENTIN 300 MG/1
600 CAPSULE ORAL NIGHTLY
Status: DISCONTINUED | OUTPATIENT
Start: 2018-05-14 | End: 2018-05-18

## 2018-05-14 RX ORDER — CARVEDILOL 3.12 MG/1
3.12 TABLET ORAL 2 TIMES DAILY WITH MEALS
Status: DISCONTINUED | OUTPATIENT
Start: 2018-05-14 | End: 2018-05-18

## 2018-05-14 RX ORDER — OMEPRAZOLE 20 MG/1
40 CAPSULE, DELAYED RELEASE ORAL
COMMUNITY

## 2018-05-14 RX ORDER — BUPROPION HYDROCHLORIDE 150 MG/1
150 TABLET, EXTENDED RELEASE ORAL 2 TIMES DAILY
Status: DISCONTINUED | OUTPATIENT
Start: 2018-05-14 | End: 2018-05-18

## 2018-05-14 RX ORDER — VARENICLINE TARTRATE 1 MG/1
1 TABLET, FILM COATED ORAL 2 TIMES DAILY
Status: DISCONTINUED | OUTPATIENT
Start: 2018-05-14 | End: 2018-05-18

## 2018-05-14 NOTE — SIGNIFICANT EVENT
Patient came to 4SE from MRI after being admitted from [facility]. Vitals stable. Blood sugar taken. Report to be given to oncoming night RN. Did not do head to toe assessment.

## 2018-05-15 PROBLEM — R41.82 ALTERED MENTAL STATE: Status: ACTIVE | Noted: 2018-05-15

## 2018-05-15 PROBLEM — E11.9 DIABETES MELLITUS (HCC): Chronic | Status: ACTIVE | Noted: 2018-05-15

## 2018-05-15 RX ORDER — ENOXAPARIN SODIUM 100 MG/ML
40 INJECTION SUBCUTANEOUS DAILY
Status: DISCONTINUED | OUTPATIENT
Start: 2018-05-15 | End: 2018-05-18

## 2018-05-15 NOTE — PHYSICAL THERAPY NOTE
PHYSICAL THERAPY EVALUATION - INPATIENT     Room Number: 456/456-A  Evaluation Date: 5/15/2018  Type of Evaluation: Initial   Physician Order: PT Eval and Treat    Presenting Problem: frequent falls, LBP with R sciatica  Reason for Therapy: Mobility Dysfu maintain stance before tremors stopped and he was then able to walk. He required MIN A supine>sit and moved slowly due to pain in back. Continued skilled PT indicated. Most likely will require rehab upon discharge. Neurology consult is pending.      Ivette Hemilaminectomy   2/2013: BACK SURGERY      Comment: L4-L5   1990: BACK SURGERY      Comment: L4-L5   1996: BACK SURGERY      Comment: L3-L4 TLIF with instrumentation and allograft                11/7/14 09/26/2017: BACK SURGERY      Comment: L3 laminecto ;  Location: [address] [place] [address]  3/21/2016: LUANNE-PAULY BY PENNY LOREDO [facility] 5+ YR Right      Comment: Procedure: LUMBAR / TRANSFORAMINAL EPIDURAL                STEROID INJECTION;  Surgeon: Leilani Jacobs DO;  Location: Spouse; Family  Drives: Yes  Patient Owned Equipment: Rolling walker;Cane  Patient Regularly Uses: Glasses    Prior Level of [place]: Used cane indoors, RW outdoors but was having many falls- up to 3 in recent month. Driving. Able to do up/down stairs. 120' with 1 standing rest break and was fatigued at this distance which is below baseline of 1/2 block per patient report. AM-PAC '6-Clicks' INPATIENT SHORT FORM - BASIC MOBILITY  How much difficulty does the patient currently have. ..  -   Turning over with SBA. Goal #3   Current Status    Goal #4 Patient will negotiate 9 stairs with 1 rail and SBA if going home. No stairs goal if going to rehab.     Goal #4   Current Status    Goal #5 Patient to demonstrate independence with home activity/exercise ins

## 2018-05-15 NOTE — H&P
[address] Gerardo Présclotilde Tim Patient Status:  Inpatient    6/15/1950 MRN [id number]   Location [facility] 4W/SW/SE Attending Elmo Davis MD   Hosp Day # 1 PCP Hernan Olsen MD     History of Pres Procedure: LUMBAR / TRANSFORAMINAL EPIDURAL                STEROID INJECTION;  Surgeon: Ingrid Hernandez DO;  Location: [address] [place] [address]  1/11/2016: INJECTION, ANESTHETIC/STEROID, TRANSFORAMINAL * Right      Comment: Procedure: Tatiana Abo STEROID INJECTION;  Surgeon: Yossi Allred DO;  Location: [address] [place] [address]  1/11/2016: PATIENT [place] PREOPERATIVE ORDER FOR IV ANT* Right      Comment: Procedure: LUMBAR / TRANSFORAMINAL EPIDURAL total) by mouth every 8 (eight) hours as needed (muscle spasms). (Patient taking differently: Take 4 mg by mouth daily.  ) Disp: 50 tablet Rfl: 0 Taking   FLUoxetine HCl 40 MG Oral Cap Take 40 mg by mouth daily.  Disp:  Rfl:  Not Taking   atorvastatin 20 MG tongue normal; teeth and gums normal   Neck:   Supple, symmetrical, trachea midline, no adenopathy;     thyroid:  no enlargement/tenderness/nodules; no carotid    bruit or JVD   Back:     Symmetric, no curvature, ROM normal, no CVA tenderness   Lungs: region    Prostate cancer [facility])    Chronic right-sided low back pain with right-sided sciatica    Intractable back pain    Spinal stenosis, lumbar region, with neurogenic claudication    Altered mental state    Diabetes mellitus ([facility] [address]    Falls    Confusion

## 2018-05-15 NOTE — DIETARY NOTE
Patient educated regarding diabetes diet basics. Discussed carbohydrate foods, portion sizes, and food label reading. Handout given. Encouraged to attend outpatient diabetes education. Questions answered. Receptive to information.

## 2018-05-15 NOTE — DIETARY NOTE
Brief Nutrition Note:    Pt screened at risk by RN at admission due to:    Poor po intake--pt reports eating less than normal due to decreased appetite, but ate 100% of breakfast this am.  Wt loss 17#--Wt Readings from Last 15 Encounters:  05/14/18 : 98.7

## 2018-05-15 NOTE — OCCUPATIONAL THERAPY NOTE
OCCUPATIONAL THERAPY EVALUATION - INPATIENT      Room Number: 456/456-A  Evaluation Date: 5/15/2018  Type of Evaluation: Initial  Presenting Problem: frequent falls    Physician Order: IP Consult to Occupational Therapy  Reason for Therapy: ADL/IADL Dysfun activity tolerance and functional endurance. These deficits manifest functionally while performing ADLs/IADLs.    The patient is below baseline and would benefit from skilled inpatient OT to address the above deficits, maximizing patient's ability to retur date:  CABG  No date: HERNIA SURGERY  1/11/2016: INJECTION, ANESTHETIC/STEROID, TRANSFORAMINAL * Right      Comment: Procedure: LUMBAR / TRANSFORAMINAL EPIDURAL                STEROID INJECTION;  Surgeon: Sabra James DO;  [facility]: [facility]  1/11/2016: PATIENT DOCUMENTED NOT TO HAVE EXPERIENCED ANY* Right      Comment: Procedure: LUMBAR / TRANSFORAMINAL EPIDURAL                STEROID INJECTION;  Surgeon: Trevor Velarde DO;  Location: [address] [place] [address]  3/21/2016: basement, 10 stairs to living room/kitchen area   Assistive Device(s) Used: RW or SPC     Prior Level of [place]: Patient reports being mod I for ADLs (use of LHAE), mod I for ambulation (use of cane primarily w/ use of RW outside the home; patient re Putting on and taking off regular upper body clothing?: A Little  -   Taking care of personal grooming such as brushing teeth?: None  -   Eating meals?: None    AM-PAC Score:  Score: 20  Approx Degree of Impairment: 38.32%  Standardized Score (AM-PAC Scale

## 2018-05-16 ENCOUNTER — APPOINTMENT (OUTPATIENT)
Dept: MRI IMAGING | Facility: HOSPITAL | Age: 68
DRG: 552 | End: 2018-05-16
Attending: INTERNAL MEDICINE
Payer: MEDICARE

## 2018-05-16 PROCEDURE — 72141 MRI NECK SPINE W/O DYE: CPT | Performed by: INTERNAL MEDICINE

## 2018-05-16 PROCEDURE — 72146 MRI CHEST SPINE W/O DYE: CPT | Performed by: INTERNAL MEDICINE

## 2018-05-16 PROCEDURE — 99223 1ST HOSP IP/OBS HIGH 75: CPT | Performed by: OTHER

## 2018-05-16 PROCEDURE — 72158 MRI LUMBAR SPINE W/O & W/DYE: CPT | Performed by: INTERNAL MEDICINE

## 2018-05-16 NOTE — CM/SW NOTE
5/16CM-The Patient's RN informed this Writer that the Patient's daughter Ester Sung has questions about discharge. This Writer spoke with the Patient's daughter Ester Sung ([phone]) in regards to discharge planning.   The Patient resides with his wife and da

## 2018-05-16 NOTE — CONSULTS
Neurology Inpatient Consult Note    Linda Flowers : 6/15/1950   Referring Physician: Dr. Joana Barajas  HPI:     Linda Flowers is a 74year old male who is being seen in neurologic evaluation.     Patient is being seen in evaluation for recurren impairment     reading      Past Surgical History:  3/2013: BACK SURGERY      Comment: left L4 Hemilaminectomy   2/2013: BACK SURGERY      Comment: L4-L5   1990: BACK SURGERY      Comment: L4-L5   1996: BACK SURGERY      Comment: L3-L4 TLIF with instrument LUMBAR / TRANSFORAMINAL EPIDURAL                STEROID INJECTION;  Surgeon: León Parnell DO;  Location: [address] [place] [address]  3/21/2016: M-PAULY BY PENNY LOREDO [facility] 5+ YR Right      Comment: Procedure: LUMBAR / TRANSFORAMINAL EP Diabetes Father    • Diabetes Mother       Social History:  Social History    Marital status:              Spouse name:                       Years of education:                 Number of children:               Social History Main Topics    Smoking right side  Reflexes: DTRs 2+ in bilateral biceps, brachioradialis, 0 in patella, right toe upgoing and left toe downgoing, no ankle clonus, negative Chivo sign bilaterally  Coordination / gait: Left greater than right finger-nose-finger and heel to gilles joint arthropathy at L2-L3. There is now moderate  to severe central canal stenosis.         LABS: reviewed   Hemoglobin A1c 10.1  ESR 16  TSH 1.63    ASSESSMENT AND PLAN:   Dizziness  Gait impairment  Lumbar spinal stenosis with radiculopathy  Complex clin

## 2018-05-16 NOTE — PHYSICAL THERAPY NOTE
PHYSICAL THERAPY TREATMENT NOTE - INPATIENT     Room Number: 456/456-A       Presenting Problem: frequent falls, LBP with R sciatica    Problem List  Active Problems:    Spondylolisthesis    S/P lumbar fusion    Hip pain, right    Degenerative arthritis of leg  Management Techniques:  Activity promotion    BALANCE                                                                                                                     Static Sitting: Good  Dynamic Sitting: Good           Static Standing: Fair +  Dyn into RW. Goal #2  Current Status Min A   Goal #3 Patient is able to ambulate 200' w/ RW with SBA. Goal #3   Current Status 250 ft with RW with Min A   Goal #4 Patient will negotiate 9 stairs with 1 rail and SBA if going home.  No stairs goal if siddharth

## 2018-05-16 NOTE — PLAN OF CARE
Diabetes/Glucose Control    • Glucose maintained within prescribed range Progressing        Patient Centered Care    • Patient preferences are identified and integrated in the patient's plan of care Progressing        Pt up to chair and bathroom with walke

## 2018-05-16 NOTE — PROGRESS NOTES
[address] Progress Note    Andrea Michele Patient Status:  Inpatient    6/15/1950 MRN [id number]   Location [facility] 4W/SW/SE Attending Elmo Davis MD   Hosp Day # 2 PCP Hernan Olsen MD     Subjective:  Delano Oakes axillary nodes normal   Neurologic:   CNII-XII intact.  Normal strength, sensation and reflexes       throughout         Labs:     Recent Labs   Lab  05/14/18   1511   WBC  8.1   HGB  13.4*   MCV  92.4   PLT  215       Recent Labs   Lab  05/14/18   1511  05 INDICATIONS: Head injury with headache. History of prostate cancer, diabetes, hyperlipidemia, hypertension. TECHNIQUE: CT images were obtained without contrast material.  Automated exposure control for dose reduction was used.    FINDINGS:  CSF SPACES: Samaria Nunes opacification and bilateral maxillary sinus mucosal thickening. Ethmoid infundibula are patent. Sphenoid sinuses are clear. No demonstrated sinus wall fracture or significant fluid FACIAL BONES: Zygomatic arches are intact.  Small avulsion fractures of the (PND=23935)  COMPARISON: [facility], CT BRAIN OR HEAD (CPT=70450), 4/19/2018, 12:37. INDICATIONS: AMS. Unsteady gait. Fatigue. Transient alteration in awareness.   TECHNIQUE: A variety of imaging planes and parameters were utilized for visu Specifically, no evidence of acute or early subacute infarction. No suspicious enhancing intracranial foci. 2. Mild presumed sequelae of chronic microangiopathy. 3. 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  4. Trace bilatera

## 2018-05-17 PROCEDURE — 99232 SBSQ HOSP IP/OBS MODERATE 35: CPT | Performed by: OTHER

## 2018-05-17 RX ORDER — POLYETHYLENE GLYCOL 3350 17 G/17G
17 POWDER, FOR SOLUTION ORAL DAILY PRN
Status: DISCONTINUED | OUTPATIENT
Start: 2018-05-17 | End: 2018-05-18

## 2018-05-17 NOTE — PROGRESS NOTES
[facility]    Progress Note    Aj Reyes Patient Status:  Inpatient    6/15/1950 MRN [id number]   Location [facility] 4W/SW/SE Attending Ingrid Green MD   Hosp Day # 3 PCP Kimberly Tim MD       SUBJECTIVE:  Continues Aspart Pen (NOVOLOG) 100 UNIT/ML flexpen 30 Units 30 Units Subcutaneous TID AC   FLUoxetine HCl (PROZAC) cap 40 mg 40 mg Oral Daily   Pantoprazole Sodium (PROTONIX) EC tab 40 mg 40 mg Oral QAM AC   ibuprofen (MOTRIN) tab 600 mg 600 mg Oral QID   BuPROPion

## 2018-05-17 NOTE — OCCUPATIONAL THERAPY NOTE
OCCUPATIONAL THERAPY TREATMENT NOTE - INPATIENT        Room Number: 456/456-A           Presenting Problem: frequent falls    Problem List  Active Problems:    Spondylolisthesis    S/P lumbar fusion    Hip pain, right    Degenerative arthritis of pelvis setting (ASHLEY versus HH w/ increased spv/assist for safety d/t falls)  OT Device Recommendations: Raised toilet seat;Grab bars     PLAN  OT Treatment Plan: Energy conservation/work simplification techniques;Balance activities; ADL training;IADL training; Func Patient End of Session: Up in chair;Needs met;Call light within reach;RN aware of session/findings; Alarm set    OT Goals:     Patient self-stated goal is: get stronger      Patient will complete LE dressing with mod I and use of LHAE prn.    Comment: prog

## 2018-05-17 NOTE — CM/SW NOTE
SW was notified the pt. May need assist from Thomas Paris upon discharge. Tentative referral has been made to [facility]. Specific C orders and a face to face will be needed prior to discharge.       [phone]

## 2018-05-17 NOTE — CM/SW NOTE
CTL progression of care note:  Pt was seen per Dr. Fay Yee. Progress notes indicate that pt has been instructed to see his original spine surgeon (pt informed MD that he already has an appointment).   Dr. Fay Yee recommended to pt that he goes to [employer]

## 2018-05-17 NOTE — PROGRESS NOTES
Neurology Inpatient Follow-up Note      HPI:     Patient feeling about the same. Leg still very shaky when he stands up and tries to walk. Wife at bedside.       Past Medical Hisotory:  Reviewed    Medications:  Reviewed    Allergies:    Amoxicillin Mild central stenosis. Moderate to severe bilateral foraminal narrowing. L5-S1: Mild bilateral foraminal narrowing. Posterior and interbody fusion changes present at L3-L4. MRI thoracic spine  CONCLUSION:   1.  Mild multilevel thoracic spine deg time.  Please page with any questions / concerns.     Wilner Ponce MD  [address] [place], [place] [address] [phone]

## 2018-05-17 NOTE — CONSULTS
[facility]    PATIENT'S NAME: Clara Vega   ATTENDING PHYSICIAN: Jaqueline Watt. Rashawn Gifford MD   CONSULTING PHYSICIAN: Willie Brannon.  Sneha Hoffman MD   PATIENT ACCOUNT#:   [id number]    LOCATION:  [address] [place] [address] #:   [id number]       DATE OF BIR Evidence of lumbar fusion below the level of stenosis. ASSESSMENT:    1.   Lumbar stenosis. 2.   Postlaminectomy syndrome. PLAN:  The patient has an appointment with his original surgeon for followup care.   At this point, I would recommend that h

## 2018-05-18 ENCOUNTER — APPOINTMENT (OUTPATIENT)
Dept: ULTRASOUND IMAGING | Facility: HOSPITAL | Age: 68
DRG: 552 | End: 2018-05-18
Attending: INTERNAL MEDICINE
Payer: MEDICARE

## 2018-05-18 VITALS
HEART RATE: 68 BPM | SYSTOLIC BLOOD PRESSURE: 124 MMHG | TEMPERATURE: 98 F | DIASTOLIC BLOOD PRESSURE: 68 MMHG | HEIGHT: 67 IN | OXYGEN SATURATION: 96 % | BODY MASS INDEX: 34.17 KG/M2 | RESPIRATION RATE: 18 BRPM | WEIGHT: 217.69 LBS

## 2018-05-18 PROCEDURE — 93923 UPR/LXTR ART STDY 3+ LVLS: CPT | Performed by: INTERNAL MEDICINE

## 2018-05-18 NOTE — PHYSICAL THERAPY NOTE
PHYSICAL THERAPY TREATMENT NOTE - INPATIENT     Room Number: 456/456-A       Presenting Problem: frequent falls, LBP with R sciatica    Problem List  Active Problems:    Spondylolisthesis    S/P lumbar fusion    Hip pain, right    Degenerative arthritis of MOBILITY  How much difficulty does the patient currently have. ..  -   Turning over in bed (including adjusting bedclothes, sheets and blankets)?: A Little   -   Sitting down on and standing up from a chair with arms (e.g., wheelchair, bedside commode, etc. stairs goal if going to rehab. Goal #4   Current Status  up/down 9 stairs with L rail and Min A   Goal #5 Patient to demonstrate independence with home activity/exercise instructions provided to patient in preparation for discharge.    Goal #5   Current

## 2018-05-18 NOTE — CM/SW NOTE
[facility]  Face to Face Encounter Note    Reny Laguna Patient Status:  Inpatient    6/15/1950 MRN [id number]   Location [facility] 4W/SW/SE Attending Adelaida Adame MD   Hosp Day # 4 PCP Nadine Mcgowan MD       I, or a non-physician p device.     Certification of home health services  Based on the above findings, I certify that this patient is confined to the home and needs intermittent skilled nursing care, physical therapy and/or speech therapy or continues to need occupational therapy

## 2018-05-18 NOTE — CM/SW NOTE
5/18CM- The Patient's RN informed this Writer gi the Patient is medically stable for discharge today (5/18). This Writer informed Selena NORMAN that sent out the final orders and face to Face form are needed.    Case Management previously noted that a referral

## 2018-05-18 NOTE — FACE TO FACE NOTE
[facility]  Face to Face Encounter Note    Tamera Casiano Patient Status:  Inpatient    6/15/1950 MRN [id number]   Location [facility] 4W/SW/SE Attending Fermín Flowers MD   Hosp Day # 4 PCP Farideh Thrasher MD       I, or a non-physician p care.  This physician will be followed by a physician who will periodically review the plan of care.   The findings from this face-to-face encounter have been communicated with the patient's community-based physician who will be assuming this patient's home

## 2018-05-18 NOTE — CM/SW NOTE
CTL progression of care note:  CTL called office of Dr. Krys Romano ([phone]) informed Dr. Krys Romano is currently seeing pts. Informed staff that I need to speak with MD regarding home health orders. My contact information left.     3:00 PM - Called office of

## 2018-05-18 NOTE — PLAN OF CARE
Diabetes/Glucose Control    • Glucose maintained within prescribed range Completed        GASTROINTESTINAL - ADULT    • Minimal or absence of nausea and vomiting Completed    • Maintains or returns to baseline bowel function Completed        PAIN - ADULT

## 2018-05-21 ENCOUNTER — PRIOR ORIGINAL RECORDS (OUTPATIENT)
Dept: OTHER | Age: 68
End: 2018-05-21

## 2018-05-21 ENCOUNTER — TELEPHONE (OUTPATIENT)
Dept: MEDSURG UNIT | Facility: HOSPITAL | Age: 68
End: 2018-05-21

## 2018-05-24 ENCOUNTER — PRIOR ORIGINAL RECORDS (OUTPATIENT)
Dept: OTHER | Age: 68
End: 2018-05-24

## 2018-06-07 NOTE — DISCHARGE SUMMARY
[facility] [address] Discharge Summary    Estrellitarajinder Mitchell Patient Status:  Inpatient    6/15/1950 MRN [id number]   Location [facility] 4W/SW/SE Attending No att. providers found   Hosp Day # 4 PCP Ayala Villalta MD     Date of Admis lower extremities is 5/5 in quad, ankle dorsiflexion, plantar flexion, EHL on the right side. On the left side, ankle dorsiflexion is 3/5. Quad strength is 4/5. Ankle plantar flexion is 5/5. EHL is 4/5.   Sensation is diminished in bilateral lower extre bilateral lower extremities in stocking distribution. Posterior tibial pulses are 1+ symmetric. No clonus or Babinski elicited. Reflexes are 1+ symmetric.     IMAGING:  MRI of the lumbar spine was reviewed.   The MRI showed severe lumbar stenosis at L2-3 nightly.  , Historical, R-0    gabapentin (NEURONTIN) 600 MG Oral Tab  Take 600 mg by mouth nightly.  , Historical, R-1    Meloxicam 15 MG Oral Tab  Take 15 mg by mouth daily. , Historical    TraZODone HCl (DESYREL) 150 MG Oral Tab  Take 300 mg by mouth nig

## 2018-10-15 ENCOUNTER — HOSPITAL ENCOUNTER (OUTPATIENT)
Dept: NUCLEAR MEDICINE | Facility: HOSPITAL | Age: 68
Discharge: HOME | End: 2018-10-15
Attending: FAMILY MEDICINE
Payer: MEDICARE

## 2018-10-15 DIAGNOSIS — R74.8 ALKALINE PHOSPHATASE ELEVATION: ICD-10-CM

## 2018-10-15 PROCEDURE — 78306 BONE IMAGING WHOLE BODY: CPT | Performed by: FAMILY MEDICINE

## 2019-02-28 VITALS
RESPIRATION RATE: 16 BRPM | SYSTOLIC BLOOD PRESSURE: 106 MMHG | HEART RATE: 80 BPM | WEIGHT: 232 LBS | HEIGHT: 67 IN | DIASTOLIC BLOOD PRESSURE: 60 MMHG | BODY MASS INDEX: 36.41 KG/M2

## 2019-02-28 VITALS
RESPIRATION RATE: 16 BRPM | BODY MASS INDEX: 35 KG/M2 | HEART RATE: 68 BPM | HEIGHT: 67 IN | WEIGHT: 223 LBS | DIASTOLIC BLOOD PRESSURE: 72 MMHG | SYSTOLIC BLOOD PRESSURE: 124 MMHG

## 2019-03-01 VITALS
WEIGHT: 229 LBS | HEIGHT: 67 IN | RESPIRATION RATE: 16 BRPM | BODY MASS INDEX: 35.94 KG/M2 | DIASTOLIC BLOOD PRESSURE: 92 MMHG | SYSTOLIC BLOOD PRESSURE: 142 MMHG | HEART RATE: 72 BPM

## 2019-03-01 VITALS
WEIGHT: 223 LBS | HEIGHT: 67 IN | BODY MASS INDEX: 35 KG/M2 | DIASTOLIC BLOOD PRESSURE: 71 MMHG | RESPIRATION RATE: 16 BRPM | SYSTOLIC BLOOD PRESSURE: 108 MMHG | HEART RATE: 73 BPM

## 2019-03-01 VITALS
HEART RATE: 72 BPM | HEIGHT: 67 IN | BODY MASS INDEX: 35.63 KG/M2 | DIASTOLIC BLOOD PRESSURE: 80 MMHG | RESPIRATION RATE: 16 BRPM | SYSTOLIC BLOOD PRESSURE: 122 MMHG | WEIGHT: 227 LBS

## (undated) DEVICE — E-Z CLEAN, NON-STICK, PTFE COATED, ELECTROSURGICAL BLADE ELECTRODE, MODIFIED EXTENDED INSULATION, 2.5 INCH (6.35 CM): Brand: MEGADYNE

## (undated) DEVICE — SUTURE VICRYL 0 CT-1

## (undated) DEVICE — LAMINECTOMY: Brand: MEDLINE INDUSTRIES, INC.

## (undated) DEVICE — 3.0MM PRECISION NEURO (MATCH HEAD)

## (undated) DEVICE — DRAPE SHEET LG

## (undated) DEVICE — GAUZE SPONGES,12 PLY: Brand: CURITY

## (undated) DEVICE — CAUTERY TIP TEFLON MEGADYNE

## (undated) DEVICE — CUSHION INSERT PRONEVIEW LG

## (undated) DEVICE — FLOSEAL SEALENT STERILE 10ML

## (undated) DEVICE — 3M(TM) TEGADERM(TM) TRANSPARENT FILM DRESSING FRAME STYLE 1628: Brand: 3M™ TEGADERM™

## (undated) DEVICE — SOL  .9 1000ML BAG

## (undated) DEVICE — COVER PSTN BW FRM SKN CR KT

## (undated) DEVICE — SUTURE VICRYL 2-0 CT-2

## (undated) DEVICE — SOL  .9 1000ML BTL

## (undated) DEVICE — DRESSING AQUACEL AG 3.5 X 6

## (undated) DEVICE — PROXIMATE SKIN STAPLERS (35 WIDE) CONTAINS 35 STAINLESS STEEL STAPLES (FIXED HEAD): Brand: PROXIMATE

## (undated) DEVICE — SUTURE MONOCRYL 3-0 Y936H

## (undated) DEVICE — SUTURE ETHILON 2-0 FS

## (undated) NOTE — ED AVS SNAPSHOT
Karly Kong   MRN: [id number]    Department:  [facility] Emergency Department   Date of Visit:  4/19/2018           Disclosure     Insurance plans vary and the physician(s) referred by the ER may not be covered by your plan.  Please contact CARE PHYSICIAN AT ONCE OR RETURN IMMEDIATELY TO THE EMERGENCY DEPARTMENT. If you have been prescribed any medication(s), please fill your prescription right away and begin taking the medication(s) as directed.   If you believe that any of the medications

## (undated) NOTE — ED AVS SNAPSHOT
Emelia Browne   MRN: [id number]    Department:  [facility] Emergency Department   Date of Visit:  9/30/2017           Disclosure     Insurance plans vary and the physician(s) referred by the ER may not be covered by your plan.  Please contact CARE PHYSICIAN AT ONCE OR RETURN IMMEDIATELY TO THE EMERGENCY DEPARTMENT. If you have been prescribed any medication(s), please fill your prescription right away and begin taking the medication(s) as directed.   If you believe that any of the medications